# Patient Record
Sex: MALE | NOT HISPANIC OR LATINO | URBAN - METROPOLITAN AREA
[De-identification: names, ages, dates, MRNs, and addresses within clinical notes are randomized per-mention and may not be internally consistent; named-entity substitution may affect disease eponyms.]

---

## 2017-11-21 ENCOUNTER — IMPORTED ENCOUNTER (OUTPATIENT)
Dept: URBAN - METROPOLITAN AREA CLINIC 1 | Facility: CLINIC | Age: 52
End: 2017-11-21

## 2017-11-21 PROBLEM — H26.491: Noted: 2017-11-21

## 2017-11-21 PROBLEM — H25.812: Noted: 2017-11-21

## 2017-11-21 PROBLEM — E11.9: Noted: 2017-11-21

## 2017-11-21 PROBLEM — Z79.84: Noted: 2017-11-21

## 2017-11-21 PROCEDURE — 92015 DETERMINE REFRACTIVE STATE: CPT

## 2017-11-21 PROCEDURE — 92004 COMPRE OPH EXAM NEW PT 1/>: CPT

## 2017-11-21 NOTE — PATIENT DISCUSSION
1.  DM Type II without sign of diabetic retinopathy and no blot heme on dilated retinal examination today OU No Macular Edema:  Discussed the pathophysiology of diabetes and its effect on the eye and risk of blindness. Stressed the importance of strong glucose control. Advised of importance of at least yearly dilated examinations but to contact us immediately for any problems or concerns. 2. Type II diabetes controlled by oral medications. 3.  PSC Cataract OS: Visually Significant discussed the risks benefits alternatives and limitations of cataract surgery. The patient stated a full understanding and a desire to proceed with the procedure. The patient will need to return for preop appointment with cataract measurements and to have any additional questions answered and start pre-operative eye drops as directed. Schedule melly PCL OSOtherwise follow-up for Yag Cap OD4. PCO OD- Visually Significant and yag cap recommended. Risks and benefits discussed with pt and pt desires to schedule yag cap. 5.  PSeudophakia OD [facility])- Doing well. 6.  Return for an appointment for Yag Cap OD in 1-6 weeks/ Nancy H&P OS with Dr. Trevor Beltrán.

## 2017-12-01 ENCOUNTER — IMPORTED ENCOUNTER (OUTPATIENT)
Dept: URBAN - METROPOLITAN AREA CLINIC 1 | Facility: CLINIC | Age: 52
End: 2017-12-01

## 2017-12-01 PROBLEM — H26.491: Noted: 2017-12-01

## 2017-12-01 PROBLEM — H25.812: Noted: 2017-12-01

## 2017-12-01 PROCEDURE — 92136 OPHTHALMIC BIOMETRY: CPT

## 2017-12-01 PROCEDURE — 66821 AFTER CATARACT LASER SURGERY: CPT

## 2017-12-01 NOTE — PATIENT DISCUSSION
1.  YAG CAP OD: (Consent signed and scanned into attachments) 1 gtt Prolensa applied. The purpose and nature of the procedure possible alternative methods of treatment the risks involved and the possibility of complications were discussed with patient. The Patient wishes to proceed and the consent was signed. The laser was then performed under topical anesthesia with no complications. Post op instructions were given to patient as well as a follow-up appointment. Patient was advised to call our office if any questions or concerns. Begin Prolensa Qdaily OD till sample out RTC as scheduled Phaco OS 2. Cataract OS: Visually Significant discussed the risks benefits alternatives and limitations of cataract surgery. The patient stated a full understanding and a desire to proceed with the procedure. The patient will need to start pre-operative eye drops as directed. Proceed w/ phaco PCL OSPt understands they will need glasses post-op to achieve their best corrected vision. Return for an appointment for sx OS with Dr. Nino Davis.

## 2017-12-06 ENCOUNTER — IMPORTED ENCOUNTER (OUTPATIENT)
Dept: URBAN - METROPOLITAN AREA CLINIC 1 | Facility: CLINIC | Age: 52
End: 2017-12-06

## 2017-12-06 PROBLEM — H25.812 COMBINED FORM OF SENILE CATARACT OF LEFT EYE: Status: RESOLVED | Noted: 2017-12-06 | Resolved: 2017-12-06

## 2017-12-06 PROBLEM — H25.812 COMBINED FORM OF SENILE CATARACT OF LEFT EYE: Status: ACTIVE | Noted: 2017-12-06

## 2017-12-07 ENCOUNTER — IMPORTED ENCOUNTER (OUTPATIENT)
Dept: URBAN - METROPOLITAN AREA CLINIC 1 | Facility: CLINIC | Age: 52
End: 2017-12-07

## 2017-12-07 PROBLEM — Z09: Noted: 2017-12-07

## 2017-12-07 PROBLEM — Z96.1: Noted: 2017-12-07

## 2017-12-07 PROCEDURE — 99024 POSTOP FOLLOW-UP VISIT: CPT

## 2017-12-07 NOTE — PATIENT DISCUSSION
1. POD#1 CE/IOL Standard OS doing well. Continue all 3 gtts as prescribed and until gone. Ocuflox TIDDurezol BIDIlevro QDPost op Warnings Reiterated  2. PO YAG Cap OD: good result. 3.  Return for an appointment for KR OU as scheduled with Dr. Beverley Singleton.

## 2018-01-02 ENCOUNTER — IMPORTED ENCOUNTER (OUTPATIENT)
Dept: URBAN - METROPOLITAN AREA CLINIC 1 | Facility: CLINIC | Age: 53
End: 2018-01-02

## 2018-01-02 PROBLEM — Z96.1: Noted: 2018-01-02

## 2018-01-02 PROBLEM — Z09: Noted: 2018-01-02

## 2018-01-02 PROCEDURE — 99024 POSTOP FOLLOW-UP VISIT: CPT

## 2018-01-02 NOTE — PATIENT DISCUSSION
1. POW#3 Phaco/ PCL OS (Standard) Doing well Finish PO meds per schedule MRX for glasses given 2. PO YAG Cap OD: good result. Return for an appointment in 6 mo 30 with Dr. Flor Vidales.

## 2018-07-11 ENCOUNTER — HOSPITAL ENCOUNTER (OUTPATIENT)
Dept: LAB | Age: 53
Discharge: HOME | End: 2018-07-11
Payer: COMMERCIAL

## 2018-07-11 ENCOUNTER — OFFICE VISIT (OUTPATIENT)
Dept: FAMILY MEDICINE CLINIC | Facility: CLINIC | Age: 53
End: 2018-07-11

## 2018-07-11 VITALS
BODY MASS INDEX: 28 KG/M2 | DIASTOLIC BLOOD PRESSURE: 82 MMHG | HEART RATE: 64 BPM | SYSTOLIC BLOOD PRESSURE: 132 MMHG | TEMPERATURE: 98.5 F | WEIGHT: 164 LBS | RESPIRATION RATE: 16 BRPM | HEIGHT: 64 IN

## 2018-07-11 DIAGNOSIS — R86.9 SEMEN ABNORMALITY: ICD-10-CM

## 2018-07-11 DIAGNOSIS — E11.9 CONTROLLED TYPE 2 DIABETES MELLITUS WITHOUT COMPLICATION, WITHOUT LONG-TERM CURRENT USE OF INSULIN (HCC): Primary | ICD-10-CM

## 2018-07-11 DIAGNOSIS — Z12.5 SCREENING FOR PROSTATE CANCER: ICD-10-CM

## 2018-07-11 DIAGNOSIS — E78.5 HYPERLIPIDEMIA, UNSPECIFIED HYPERLIPIDEMIA TYPE: ICD-10-CM

## 2018-07-11 DIAGNOSIS — B35.6 TINEA CRURIS: ICD-10-CM

## 2018-07-11 DIAGNOSIS — I10 ESSENTIAL HYPERTENSION: ICD-10-CM

## 2018-07-11 DIAGNOSIS — Z13.31 SCREENING FOR DEPRESSION: ICD-10-CM

## 2018-07-11 LAB
BILIRUB UR QL STRIP: NEGATIVE
GLUCOSE UR-MCNC: NORMAL MG/DL
HBA1C MFR BLD HPLC: 5.7 %
KETONES P FAST UR STRIP-MCNC: NEGATIVE MG/DL
PH UR STRIP: 6 [PH] (ref 4.6–8)
PROT UR QL STRIP: NEGATIVE
SP GR UR STRIP: 1.03 (ref 1–1.03)
UA UROBILINOGEN AMB POC: NORMAL (ref 0.2–1)
URINALYSIS CLARITY POC: CLEAR
URINALYSIS COLOR POC: YELLOW
URINE BLOOD POC: NORMAL
URINE LEUKOCYTES POC: NEGATIVE
URINE NITRITES POC: NEGATIVE

## 2018-07-11 PROCEDURE — 87086 URINE CULTURE/COLONY COUNT: CPT | Performed by: INTERNAL MEDICINE

## 2018-07-11 RX ORDER — NYSTATIN 100000 U/G
OINTMENT TOPICAL 2 TIMES DAILY
Qty: 60 G | Refills: 3 | Status: SHIPPED | OUTPATIENT
Start: 2018-07-11 | End: 2018-08-08

## 2018-07-11 RX ORDER — LISINOPRIL 5 MG/1
TABLET ORAL
Refills: 3 | COMMUNITY
Start: 2018-06-18 | End: 2018-08-08 | Stop reason: SDUPTHER

## 2018-07-11 NOTE — PROGRESS NOTES
Ronnie Quiles is 46 y.o. male presents today for office to establish care. Pt c/o rash to bilateral inner thighs for over 3 months. Pt c/o yellow colored ejaculation x 1 month. Pt is not fasting. Pt is in Room# 2. 1. ER/Urgent care visit in the past 3 months? NO  2. Have you seen or consulted any other health care providers outside of the [facility] since your last visit? Include any pap smears or colon screening. Health Maintenance reviewed and pt advised to obtain tdap from pharmacy.       Upcoming Appts  NONE    Requested Prescriptions      No prescriptions requested or ordered in this encounter   for over 3 mo    Visit Vitals    /82 (BP 1 Location: Left arm, BP Patient Position: Sitting)    Pulse 64    Temp 98.5 °F (36.9 °C) (Oral)    Resp 16    Ht 5' 4\" (1.626 m)    Wt 164 lb (74.4 kg)    BMI 28.15 kg/m2

## 2018-07-11 NOTE — MR AVS SNAPSHOT
[address] [id number] 
[phone] Patient: Becky Nava MRN: [id number] :1965 Visit Information Date & Time Provider Department Dept. Phone Encounter #  
 2018 10:00 AM Amber Aguilera  Shruthi James [phone] [id number] Follow-up Instructions Return in about 4 weeks (around 2018) for Go over lab/imaging results, Follow up diabetes mellitus, Follow up weight management, Follow up hyp. Upcoming Health Maintenance Date Due Hepatitis C Screening 1965 DTaP/Tdap/Td series (1 - Tdap) 1986 FOBT Q 1 YEAR AGE 50-75 2015 Influenza Age 5 to Adult 2018 Allergies as of 2018  Review Complete On: 2018 By: Amber Aguilera MD  
  
 Severity Noted Reaction Type Reactions Chicken Derived  2018    Hives Shellfish Derived  2018    Hives Current Immunizations  Never Reviewed No immunizations on file. Not reviewed this visit You Were Diagnosed With   
  
 Codes Comments Controlled type 2 diabetes mellitus without complication, without long-term current use of insulin ([address]    -  Primary ICD-10-CM: E11.9 ICD-9-CM: 250.00 Essential hypertension     ICD-10-CM: I10 
ICD-9-CM: 401.9 Hyperlipidemia, unspecified hyperlipidemia type     ICD-10-CM: E78.5 ICD-9-CM: 272.4 Screen for colon cancer     ICD-10-CM: Z12.11 ICD-9-CM: V76.51 Screening for depression     ICD-10-CM: Z13.89 ICD-9-CM: V79.0 Screening for prostate cancer     ICD-10-CM: Z12.5 ICD-9-CM: V76.44 Vitals BP Pulse Temp Resp Height(growth percentile) Weight(growth percentile) 132/82 (BP 1 Location: Left arm, BP Patient Position: Sitting) 64 98.5 °F (36.9 °C) (Oral) 16 5' 4\" (1.626 m) 164 lb (74.4 kg) BMI Smoking Status 28.15 kg/m2 Never Smoker Vitals History BMI and BSA Data Body Mass Index Body Surface Area  
 28.15 kg/m 2 1.83 m 2 Preferred Pharmacy Pharmacy Name Phone [facility], [address] [place] [address]  [phone] Your Updated Medication List  
  
   
This list is accurate as of 7/11/18 10:32 AM.  Always use your most recent med list.  
  
  
  
  
 atorvastatin 10 mg tablet Commonly known as:  LIPITOR Take 10 mg by mouth daily. lisinopril 5 mg tablet Commonly known as:  Robertha Roup TK ONE T PO QD  
  
 metFORMIN 500 mg tablet Commonly known as:  GLUCOPHAGE Take 500 mg by mouth two (2) times daily (with meals). We Performed the Following AMB POC HEMOGLOBIN A1C [63309 CPT(R)] [address] [facility] [ [facility] [place]] Follow-up Instructions Return in about 4 weeks (around 8/8/2018) for Go over lab/imaging results, Follow up diabetes mellitus, Follow up weight management, Follow up hyp. To-Do List   
 07/11/2018 Lab:  OCCULT BLOOD, IMMUNOASSAY (FIT)   
  
 07/11/2018 Lab:  PSA, DIAGNOSTIC (PROSTATE SPECIFIC AG)   
  
 07/11/2018 Lab:  T4, FREE   
  
 07/11/2018 Lab:  TSH 3RD GENERATION   
  
 07/14/2018 Lab:  CBC WITH AUTOMATED DIFF   
  
 07/14/2018 Lab:  LIPID PANEL   
  
 07/14/2018 Lab:  METABOLIC PANEL, COMPREHENSIVE Patient Instructions Information given on ketogenic/IF diet Introducing [facility]! [employer] introduces Connecture patient portal. Now you can access parts of your medical record, email your doctor's office, and request medication refills online. 1. In your internet browser, go to [email or web address] 2. Click on the First Time User? Click Here link in the Sign In box. You will see the New Member Sign Up page. 3. Enter your Connecture Access Code exactly as it appears below.  You will not need to use this code after youve completed the sign-up process. If you do not sign up before the expiration date, you must request a new code. · UserMojo Access Code: [email or web address] Expires: 10/9/2018 10:32 AM 
 
4. Enter the last four digits of your Social Security Number (xxxx) and Date of Birth (mm/dd/yyyy) as indicated and click Submit. You will be taken to the next sign-up page. 5. Create a UserMojo ID. This will be your UserMojo login ID and cannot be changed, so think of one that is secure and easy to remember. 6. Create a UserMojo password. You can change your password at any time. 7. Enter your Password Reset Question and Answer. This can be used at a later time if you forget your password. 8. Enter your e-mail address. You will receive e-mail notification when new information is available in [address] 9. Click Sign Up. You can now view and download portions of your medical record. 10. Click the Download Summary menu link to download a portable copy of your medical information. If you have questions, please visit the Frequently Asked Questions section of the UserMojo website. Remember, UserMojo is NOT to be used for urgent needs. For medical emergencies, dial 911. Now available from your iPhone and Android! Please provide this summary of care documentation to your next provider. Your primary care clinician is listed as José Wallace. If you have any questions after today's visit, please call [phone].

## 2018-07-11 NOTE — PROGRESS NOTES
History and Physical    Today's Date:  2018   Patient's Name: Jeana Rao   Patient's :  1965     History:     Chief Complaint   Patient presents with   [facility]    Diabetes    Skin Problem     bilateral inner thighs     Jeana Rao is a 46 y.o. male presenting for initial visit to establish care. Will obtain records from previous provider to review. Care team updated on connect care. Hypertension   This is a chronic problem, new to me. BP is at goal. Pt takes lisinopril. Pt reports compliance with this medication. Hyperlipidemia  This is a chronic problem, new to me. Current status is unknown. Pt takes lipitor. Pt reports compliance with this medication. Overweight  This is a chronic problem, new to me. This is not at goal. Pt does not exercise regularly. Pt tries to eat a healthy diet. Rash  This is a new problem. This is not controlled. This has been present for over three months. This is located on his inner thighs. Past Medical History:   Diagnosis Date    Controlled type 2 diabetes mellitus without complication, without long-term current use of insulin ([address] 2018    Diabetes ([address]     Hyperlipemia     Hypertension      Past Surgical History:   Procedure Laterality Date    HX APPENDECTOMY      HX CATARACT REMOVAL Right       reports that he has never smoked. He has never used smokeless tobacco. He reports that he does not drink alcohol or use illicit drugs.   Family History   Problem Relation Age of Onset    Heart Disease Mother     Hypertension Mother     Dementia Father      Allergies   Allergen Reactions    Chicken Derived Hives    Shellfish Derived Hives     Problem List:      Patient Active Problem List   Diagnosis Code    Essential hypertension I10    Controlled type 2 diabetes mellitus without complication, without long-term current use of insulin ([address] E11.9    Hyperlipidemia E78.5     Medications: Current Outpatient Prescriptions   Medication Sig    nystatin (MYCOSTATIN) 100,000 unit/gram ointment Apply  to affected area two (2) times a day.  metFORMIN (GLUCOPHAGE) 500 mg tablet Take 500 mg by mouth two (2) times daily (with meals).  atorvastatin (LIPITOR) 10 mg tablet Take 10 mg by mouth daily.  lisinopril (PRINIVIL, ZESTRIL) 5 mg tablet TK ONE T PO QD     No current facility-administered medications for this visit.       Review of Systems:   (Positives in bold)   General:   fevers, chills, generalized weakness, fatigue, weight change, night sweats, appetite change   Neurologic: dizziness, lightheadedness, headaches, loss of consciousness, numbness, tingling, focal weakness  Eyes:  vision changes (blurry vision, sees Dr. Eloisa Juarez), double vision, photophobia  Ears:  change in hearing, ear pain, ear discharge, ear ringing  Nose:  sneezing, runny nose, nasal congestion  Mouth/Throat: sore throat, voice change, dry mouth, difficulty swallowing  Neck:  pain, stiffness, swelling  Respiratory: dyspnea at rest, dyspnea on exertion, wheezing, cough, sputum production  Cardiovascular:   chest pain, palpitations, pedal edema, leg cramps  Gastrointestinal:  nausea, vomiting, abdominal pain, constipation, diarrhea, heart burn, bloody stools, tarry black stools, rectal pain, hemorrhoids  Urinary: dysuria, urinary frequency, nocturia, malodorous urine, difficulty initiating flow, slow urine stream  Genital (M): penile discharge, ulcerations, rashes, erectile dysfunction  Musculoskeletal:  joint pain, joint stiffness, joint swelling, back pain, focal muscle pain, diffuse myalgias  Psychiatric: insomnia, anxiety, depression, hallucinations, suicidal ideation, homicidal ideation  Endocrine: polydipsia, polyuria, polyphagia, cold intolerance, heat intolerance  Hematologic: easy bruising, easy bleeding  Dermatologic: Itching, rash    Physical Assessment:   VS:    Visit Vitals    /82 (BP 1 Location: Left arm, BP Patient Position: Sitting)    Pulse 64    Temp 98.5 °F (36.9 °C) (Oral)    Resp 16    Ht 5' 4\" (1.626 m)    Wt 164 lb (74.4 kg)    BMI 28.15 kg/m2     General:   Well-groomed, well-nourished, in no distress, pleasant, alert, appropriate and conversant. Eyes:    PERRL, EOMI. Mouth:  MMM, good dentition, oropharynx WNL without membranes, exudates, petechiae or ulcers  Neck:   Neck supple, no swelling, mass or tenderness  Cardiovascular:   No JVD. RRR, no MRG. Pulmonary:   Lungs clear bilaterally. Normal respiratory effort. Abdomen:   Abdomen soft, NT, ND, NAB  Extremities:   No edema, LEs warm and well-perfused. Neuro:   Alert and oriented, no focal deficits. No facial asymmetry noted. Skin:    No jaundice, +thickened, erythematous rash on inner thighs  MSK:   Normal ROM, 5/5 muscle strength  Psych:  No pressured speech or abnormal thought content    PHQ over the last two weeks 7/11/2018   Little interest or pleasure in doing things Not at all   Feeling down, depressed or hopeless Not at all   Total Score PHQ 2 0     Lab Results   Component Value Date/Time    Hemoglobin A1c (POC) 5.7 07/11/2018 10:00 AM     Results for orders placed or performed in visit on 07/11/18   AMB POC URINALYSIS DIP STICK AUTO W/ MICRO     Status: None   Result Value Ref Range Status    Color (UA POC) Yellow  Final    Clarity (UA POC) Clear  Final    Glucose (UA POC) 2+ Negative Final    Bilirubin (UA POC) Negative Negative Final    Ketones (UA POC) Negative Negative Final    Specific gravity (UA POC) 1.030 1.001 - 1.035 Final    Blood (UA POC) 1+ Negative Final    pH (UA POC) 6 4.6 - 8.0 Final    Protein (UA POC) Negative Negative Final    Urobilinogen (UA POC) 0.2 mg/dL 0.2 - 1 Final    Nitrites (UA POC) Negative Negative Final    Leukocyte esterase (UA POC) Negative Negative Final     Assessment/Plan & Orders:         ICD-10-CM ICD-9-CM    1.  Controlled type 2 diabetes mellitus without complication, without long-term current use of insulin (HCC) E11.9 250.00 LIPID PANEL      METABOLIC PANEL, COMPREHENSIVE      TSH 3RD GENERATION      T4, FREE      AMB POC HEMOGLOBIN A1C   2. Essential hypertension I10 401.9 CBC WITH AUTOMATED DIFF   3. Tinea cruris B35.6 110.3 nystatin (MYCOSTATIN) 100,000 unit/gram ointment   4. Hyperlipidemia, unspecified hyperlipidemia type E78.5 272.4    5. Screening for depression Z13.89 V79.0 [place] DEPRESSION SCREEN ANNUAL   6. Screening for prostate cancer Z12.5 V76.44 PSA, DIAGNOSTIC (PROSTATE SPECIFIC AG)     HM  Colon cancer: Colonoscopy done in 2016, next one in 5 yrs, done in the [facility]  Influenza vaccine: due in the fall  Pneumococcal vaccine: due at age 72   Tdap: unknown  Herpes Zoster vaccine: due at age 61  Hep B vaccine: not indicated (liver dz, DM 19-59)  Weight:  Body mass index is 28.15 kg/(m^2). Discussed the patient's BMI with him. The BMI follow up plan is as follows: improve diet and exercise at least 30 min a day five times a week  Prostate cancer:  Discuss re: screening with PSA between ages 48 and 71  Osteoporosis:  No indication for dexa scan    Healthy lifestyle has been encouraged including avoidance of tobacco, limiting or avoiding alcohol intake, heart healthy diet which is low in cholesterol and saturated fat and contains fresh fruits, vegetables and whole grains and fiber, regular exercise with goals of 20-30 minutes 3-5 days weekly and maintaining an optimal BMI. Stop metformin  Information given on ketogenic/IF diet  Pt declined  exam  Depression [email or web address]       Follow-up Disposition:  Return in about 4 weeks (around 2018) for Go over lab/imaging results, Follow up diabetes mellitus, Follow up weight management, Follow up hyp. *Patient verbalized understanding and agreement with the plan. Patient was given an after-visit summary. Hugo Morrison.  [address], [place] - Internal Medicine  2018, 9:56 AM  [address] [place] [address]  Phone [phone]  Fax [phone]

## 2018-07-13 LAB
BACTERIA SPEC CULT: NORMAL
SERVICE CMNT-IMP: NORMAL

## 2018-07-16 ENCOUNTER — HOSPITAL ENCOUNTER (OUTPATIENT)
Dept: LAB | Age: 53
Discharge: HOME | End: 2018-07-16
Payer: COMMERCIAL

## 2018-07-16 DIAGNOSIS — I10 ESSENTIAL HYPERTENSION: ICD-10-CM

## 2018-07-16 DIAGNOSIS — E11.9 CONTROLLED TYPE 2 DIABETES MELLITUS WITHOUT COMPLICATION, WITHOUT LONG-TERM CURRENT USE OF INSULIN (HCC): ICD-10-CM

## 2018-07-16 DIAGNOSIS — Z12.5 SCREENING FOR PROSTATE CANCER: ICD-10-CM

## 2018-07-16 LAB
ALBUMIN SERPL-MCNC: 4.2 G/DL (ref 3.4–5)
ALBUMIN/GLOB SERPL: 1.3 {RATIO} (ref 0.8–1.7)
ALP SERPL-CCNC: 56 U/L (ref 45–117)
ALT SERPL-CCNC: 37 U/L (ref 16–61)
ANION GAP SERPL CALC-SCNC: 8 MMOL/L (ref 3–18)
AST SERPL-CCNC: 20 U/L (ref 15–37)
BASOPHILS # BLD: 0 K/UL (ref 0–0.1)
BASOPHILS NFR BLD: 1 % (ref 0–2)
BILIRUB SERPL-MCNC: 1.1 MG/DL (ref 0.2–1)
BUN SERPL-MCNC: 19 MG/DL (ref 7–18)
BUN/CREAT SERPL: 20 (ref 12–20)
CALCIUM SERPL-MCNC: 8.5 MG/DL (ref 8.5–10.1)
CHLORIDE SERPL-SCNC: 107 MMOL/L (ref 100–108)
CHOLEST SERPL-MCNC: 105 MG/DL
CO2 SERPL-SCNC: 25 MMOL/L (ref 21–32)
CREAT SERPL-MCNC: 0.94 MG/DL (ref 0.6–1.3)
DIFFERENTIAL METHOD BLD: ABNORMAL
EOSINOPHIL # BLD: 0.4 K/UL (ref 0–0.4)
EOSINOPHIL NFR BLD: 5 % (ref 0–5)
ERYTHROCYTE [DISTWIDTH] IN BLOOD BY AUTOMATED COUNT: 11.8 % (ref 11.6–14.5)
GLOBULIN SER CALC-MCNC: 3.2 G/DL (ref 2–4)
GLUCOSE SERPL-MCNC: 135 MG/DL (ref 74–99)
HCT VFR BLD AUTO: 41.8 % (ref 36–48)
HDLC SERPL-MCNC: 37 MG/DL (ref 40–60)
HDLC SERPL: 2.8 {RATIO} (ref 0–5)
HGB BLD-MCNC: 14.3 G/DL (ref 13–16)
LDLC SERPL CALC-MCNC: 54.8 MG/DL (ref 0–100)
LIPID PROFILE,FLP: ABNORMAL
LYMPHOCYTES # BLD: 2.8 K/UL (ref 0.9–3.6)
LYMPHOCYTES NFR BLD: 36 % (ref 21–52)
MCH RBC QN AUTO: 31.5 PG (ref 24–34)
MCHC RBC AUTO-ENTMCNC: 34.2 G/DL (ref 31–37)
MCV RBC AUTO: 92.1 FL (ref 74–97)
MONOCYTES # BLD: 0.5 K/UL (ref 0.05–1.2)
MONOCYTES NFR BLD: 6 % (ref 3–10)
NEUTS SEG # BLD: 4.1 K/UL (ref 1.8–8)
NEUTS SEG NFR BLD: 52 % (ref 40–73)
PLATELET # BLD AUTO: 251 K/UL (ref 135–420)
PMV BLD AUTO: 10.8 FL (ref 9.2–11.8)
POTASSIUM SERPL-SCNC: 4 MMOL/L (ref 3.5–5.5)
PROT SERPL-MCNC: 7.4 G/DL (ref 6.4–8.2)
PSA SERPL-MCNC: 0.4 NG/ML (ref 0–4)
RBC # BLD AUTO: 4.54 M/UL (ref 4.7–5.5)
SODIUM SERPL-SCNC: 140 MMOL/L (ref 136–145)
T4 FREE SERPL-MCNC: 1.1 NG/DL (ref 0.7–1.5)
TRIGL SERPL-MCNC: 66 MG/DL (ref ?–150)
TSH SERPL DL<=0.05 MIU/L-ACNC: 0.86 UIU/ML (ref 0.36–3.74)
VLDLC SERPL CALC-MCNC: 13.2 MG/DL
WBC # BLD AUTO: 7.8 K/UL (ref 4.6–13.2)

## 2018-07-16 PROCEDURE — 80061 LIPID PANEL: CPT | Performed by: INTERNAL MEDICINE

## 2018-07-16 PROCEDURE — 84153 ASSAY OF PSA TOTAL: CPT | Performed by: INTERNAL MEDICINE

## 2018-07-16 PROCEDURE — 80053 COMPREHEN METABOLIC PANEL: CPT | Performed by: INTERNAL MEDICINE

## 2018-07-16 PROCEDURE — 84439 ASSAY OF FREE THYROXINE: CPT | Performed by: INTERNAL MEDICINE

## 2018-07-16 PROCEDURE — 84443 ASSAY THYROID STIM HORMONE: CPT | Performed by: INTERNAL MEDICINE

## 2018-07-16 PROCEDURE — 85025 COMPLETE CBC W/AUTO DIFF WBC: CPT | Performed by: INTERNAL MEDICINE

## 2018-07-20 ENCOUNTER — IMPORTED ENCOUNTER (OUTPATIENT)
Dept: URBAN - METROPOLITAN AREA CLINIC 1 | Facility: CLINIC | Age: 53
End: 2018-07-20

## 2018-07-20 PROBLEM — Z79.84: Noted: 2018-07-20

## 2018-07-20 PROBLEM — Z96.1: Noted: 2018-07-20

## 2018-07-20 PROBLEM — E11.9: Noted: 2018-07-20

## 2018-07-20 PROCEDURE — 92014 COMPRE OPH EXAM EST PT 1/>: CPT

## 2018-07-20 NOTE — PATIENT DISCUSSION
1.  DM Type II (Oral Meds) -- Without sign of diabetic retinopathy and no blot heme on dilated retinal examination today OU No Macular Edema:  Discussed the pathophysiology of diabetes and its effect on the eye and risk of blindness. Stressed the importance of strong glucose control. Advised of importance of at least yearly dilated examinations but to contact us immediately for any problems or concerns. 2. Pseudophakia OU w/ s/p YAG Cap OD (PCL & YAG Cap done in [place] [address] by [facility]) Letter to PCP. Return for an appointment in 1 YR for a 30 OU with Dr. Ana Cristina Watts. Discussed with patient best corrected vision will be achieved with glasses and / or CTL. Discussed with patient the option of refractive sx PRN.

## 2018-08-08 ENCOUNTER — OFFICE VISIT (OUTPATIENT)
Dept: FAMILY MEDICINE CLINIC | Facility: CLINIC | Age: 53
End: 2018-08-08

## 2018-08-08 ENCOUNTER — HOSPITAL ENCOUNTER (OUTPATIENT)
Dept: LAB | Age: 53
Discharge: HOME | End: 2018-08-08
Payer: COMMERCIAL

## 2018-08-08 VITALS
TEMPERATURE: 98.2 F | WEIGHT: 163.8 LBS | OXYGEN SATURATION: 95 % | RESPIRATION RATE: 16 BRPM | HEIGHT: 64 IN | HEART RATE: 64 BPM | BODY MASS INDEX: 27.96 KG/M2 | DIASTOLIC BLOOD PRESSURE: 85 MMHG | SYSTOLIC BLOOD PRESSURE: 126 MMHG

## 2018-08-08 DIAGNOSIS — E11.9 DIABETES MELLITUS WITHOUT COMPLICATION (HCC): Primary | ICD-10-CM

## 2018-08-08 DIAGNOSIS — Z11.59 NEED FOR HEPATITIS C SCREENING TEST: ICD-10-CM

## 2018-08-08 DIAGNOSIS — E78.5 HYPERLIPIDEMIA, UNSPECIFIED HYPERLIPIDEMIA TYPE: ICD-10-CM

## 2018-08-08 DIAGNOSIS — L30.9 DERMATITIS: ICD-10-CM

## 2018-08-08 DIAGNOSIS — E11.9 DIABETES MELLITUS WITHOUT COMPLICATION (HCC): ICD-10-CM

## 2018-08-08 DIAGNOSIS — Z12.11 SCREEN FOR COLON CANCER: ICD-10-CM

## 2018-08-08 DIAGNOSIS — I10 ESSENTIAL HYPERTENSION: ICD-10-CM

## 2018-08-08 PROBLEM — E66.3 OVERWEIGHT (BMI 25.0-29.9): Status: ACTIVE | Noted: 2018-08-08

## 2018-08-08 PROCEDURE — 86803 HEPATITIS C AB TEST: CPT | Performed by: INTERNAL MEDICINE

## 2018-08-08 PROCEDURE — 82043 UR ALBUMIN QUANTITATIVE: CPT | Performed by: INTERNAL MEDICINE

## 2018-08-08 PROCEDURE — 36415 COLL VENOUS BLD VENIPUNCTURE: CPT | Performed by: INTERNAL MEDICINE

## 2018-08-08 RX ORDER — ATORVASTATIN CALCIUM 10 MG/1
10 TABLET, FILM COATED ORAL DAILY
Qty: 90 TAB | Refills: 3 | Status: SHIPPED | OUTPATIENT
Start: 2018-08-08 | End: 2019-08-22 | Stop reason: SDUPTHER

## 2018-08-08 RX ORDER — LISINOPRIL 5 MG/1
TABLET ORAL
Qty: 90 TAB | Refills: 3 | Status: SHIPPED | OUTPATIENT
Start: 2018-08-08 | End: 2019-08-22 | Stop reason: SDUPTHER

## 2018-08-08 NOTE — PROGRESS NOTES
Seleta Sever is 46 y.o. male presents today for office visit for follow up for diabetes and weight management. Pt is fasting. Pt is in Room# 2.    1. Have you been to the ER, urgent care clinic since your last visit? Hospitalized since your last visit? NO    2. Have you seen or consulted any other health care providers outside of the [facility] since your last visit? Include any pap smears or colon screening. NO    Health Maintenance reviewed. Upcoming Appts  none      Requested Prescriptions     Pending Prescriptions Disp Refills    lisinopril (PRINIVIL, ZESTRIL) 5 mg tablet 90 Tab 3     Sig: TK ONE T PO QD    atorvastatin (LIPITOR) 10 mg tablet 90 Tab 3     Sig: Take 1 Tab by mouth daily.        Visit Vitals    /85 (BP 1 Location: Right arm, BP Patient Position: Sitting)    Pulse 64    Temp 98.2 °F (36.8 °C) (Oral)    Resp 16    Ht 5' 4\" (1.626 m)    Wt 163 lb 12.8 oz (74.3 kg)    SpO2 95%    BMI 28.12 kg/m2

## 2018-08-08 NOTE — PROGRESS NOTES
Internal Medicine Progress Note    Today's Date:  2018   Patient:  Bobby Ca  Patient :  1965    Subjective:     Chief Complaint   Patient presents with    Diabetes    Weight Management    Rash     bilateral groin creases       Diabetes mellitus  This is a chronic problem. BS is at goal. Pt is on metformin. Pt reports compliance with this medication. Hyperlipidemia  This is a chronic problem. This is at goal. Pt takes lipitor. Pt reports compliance with this medication.      Overweight  This is a chronic problem. This is not at goal. Pt is on the ketogenic/IF diet. Pt lost 1 lb in 4 wks.       Rash  This is a new problem. This is not controlled. This has been present for over three months. This is located on his inner thighs. Past Medical History:   Diagnosis Date    Controlled type 2 diabetes mellitus without complication, without long-term current use of insulin ([address] 2018    Dermatitis 2018    Diabetes ([address]     Hyperlipemia     Hypertension     Overweight (BMI 25.0-29. 9) 2018     Past Surgical History:   Procedure Laterality Date    HX APPENDECTOMY      HX CATARACT REMOVAL Right       reports that he has never smoked. He has never used smokeless tobacco. He reports that he does not drink alcohol or use illicit drugs. Family History   Problem Relation Age of Onset    Heart Disease Mother     Hypertension Mother     Dementia Father      Allergies   Allergen Reactions    Chicken Derived Hives    Shellfish Derived Hives     Review of Systems   Positives in bold  CV:      chest pain, palpitations  PULM:  SOB, wheezing, cough, sputum production    Current Outpatient Meds and Allergies     Current Outpatient Prescriptions on File Prior to Visit   Medication Sig Dispense Refill    metFORMIN (GLUCOPHAGE) 500 mg tablet Take 500 mg by mouth two (2) times daily (with meals). No current facility-administered medications on file prior to visit. Allergies   Allergen Reactions    Chicken Derived Hives    Shellfish Derived Hives     Objective:     VS:    Visit Vitals    /85 (BP 1 Location: Right arm, BP Patient Position: Sitting)    Pulse 64    Temp 98.2 °F (36.8 °C) (Oral)    Resp 16    Ht 5' 4\" (1.626 m)    Wt 163 lb 12.8 oz (74.3 kg)    SpO2 95%    BMI 28.12 kg/m2     General:   Well-nourished, well-groomed, pleasant, alert, in no acute distress  Head:  Normocephalic, atraumatic  Ears:  External ears WNL  Nose:  External nares WNL  Psych:  No pressured speech, no abnormal thought content  Skin:  +rash on b/l inner thighs, boil on left inner thigh  Diabetic foot exam: monofilament exam normal, no open lesions or or erythema    PHQ over the last two weeks 7/11/2018   Little interest or pleasure in doing things Not at all   Feeling down, depressed, irritable, or hopeless Not at all   Total Score PHQ 2 0     Lab Results  Component Value Date/Time   Glucose 135 (H) 07/16/2018 08:35 AM   Glucose  12/06/2017 02:40 PM   LDL, calculated 54.8 07/16/2018 08:35 AM   Creatinine 0.94 07/16/2018 08:35 AM      Assessment/Plan & Orders:         ICD-10-CM ICD-9-CM    1. Diabetes mellitus without complication (HCC) E89.0 250.00  DIABETES FOOT EXAM      MICROALBUMIN, UR, RAND W/ MICROALB/CREAT RATIO      lisinopril (PRINIVIL, ZESTRIL) 5 mg tablet      atorvastatin (LIPITOR) 10 mg tablet   2. Dermatitis L30.9 692.9 REFERRAL TO DERMATOLOGY   3. Essential hypertension I10 401.9    4. Hyperlipidemia, unspecified hyperlipidemia type E78.5 272.4    5. Screen for colon cancer Z12.11 V76.51 OCCULT BLOOD IMMUNOASSAY,DIAGNOSTIC   6.  Need for hepatitis C screening test Z11.59 V73.89 HEPATITIS C AB     Healthy lifestyle has been encouraged including avoidance of tobacco, limiting or avoiding alcohol intake, heart healthy diet which is low in cholesterol and saturated fat and contains fresh fruits, vegetables and whole grains and fiber, regular exercise with goals of 20-30 minutes 3-5 days weekly and maintaining an optimal BMI. Continue ketogenic/IF diet  Decrease metformin to 500 mg po daily  Depression screenin18    Follow-up Disposition:  Return in about 3 months (around 2018) for Follow up diabetes mellitus, Follow up hypertension, Follow up hyperlipidemia, Follow up weight amee. *Patient verbalized understanding and agreement with the plan. Patient was given an after-visit summary. Ghazala Alvarez.  [address], MD - Internal Medicine  2018, 10:46 AM  [address] James  [address] [place] [address] Crist[place], [address]  Phone [phone]  Fax [phone]

## 2018-08-08 NOTE — MR AVS SNAPSHOT
[address] [place] [address] [phone] [id number] 
[phone] Patient: Becky Nava MRN: [id number] :1965 Visit Information Date & Time Provider Department Dept. Phone Encounter #  
 2018 10:30 AM Amber Aguilera MD [facility] [phone] [id number] Upcoming Health Maintenance Date Due Hepatitis C Screening 1965 MICROALBUMIN Q1 1975 Pneumococcal 19-64 Medium Risk (1 of 1 - PPSV23) 1984 DTaP/Tdap/Td series (1 - Tdap) 1986 FOBT Q 1 YEAR AGE 50-75 2015 Influenza Age 5 to Adult 2018 EYE EXAM RETINAL OR DILATED Q1 2019 HEMOGLOBIN A1C Q6M 2019 LIPID PANEL Q1 2019 FOOT EXAM Q1 2019 Allergies as of 2018  Review Complete On: 2018 By: Richard Willett LPN Severity Noted Reaction Type Reactions Chicken Derived  2018    Hives Shellfish Derived  2018    Hives Current Immunizations  Never Reviewed No immunizations on file. Not reviewed this visit You Were Diagnosed With   
  
 Codes Comments Diabetes mellitus without complication ([facility]    -  Primary ICD-10-CM: E11.9 ICD-9-CM: 250.00 Dermatitis     ICD-10-CM: L30.9 ICD-9-CM: 692.9 Essential hypertension     ICD-10-CM: I10 
ICD-9-CM: 401.9 Hyperlipidemia, unspecified hyperlipidemia type     ICD-10-CM: E78.5 ICD-9-CM: 272.4 Screen for colon cancer     ICD-10-CM: Z12.11 ICD-9-CM: V76.51 Vitals BP Pulse Temp Resp Height(growth percentile) Weight(growth percentile) 126/85 (BP 1 Location: Right arm, BP Patient Position: Sitting) 64 98.2 °F (36.8 °C) (Oral) 16 5' 4\" (1.626 m) 163 lb 12.8 oz (74.3 kg) SpO2 BMI Smoking Status 95% 28.12 kg/m2 Never Smoker BMI and BSA Data Body Mass Index Body Surface Area  
 28.12 kg/m 2 1.83 m 2 Preferred Pharmacy Pharmacy Name Phone [facility], [address] [place] [address]  [phone] Your Updated Medication List  
  
   
This list is accurate as of 8/8/18 10:35 AM.  Always use your most recent med list.  
  
  
  
  
 atorvastatin 10 mg tablet Commonly known as:  LIPITOR Take 1 Tab by mouth daily. lisinopril 5 mg tablet Commonly known as:  Robertha Roup TK ONE T PO QD  
  
 metFORMIN 500 mg tablet Commonly known as:  GLUCOPHAGE Take 500 mg by mouth two (2) times daily (with meals). nystatin 100,000 unit/gram ointment Commonly known as:  MYCOSTATIN Apply  to affected area two (2) times a day. Prescriptions Sent to Pharmacy Refills  
 lisinopril (PRINIVIL, ZESTRIL) 5 mg tablet 3 Sig: TK ONE T PO QD Class: Normal  
 Pharmacy: [facility] [address] [place] [address]  Ph #: [phone]  
 atorvastatin (LIPITOR) 10 mg tablet 3 Sig: Take 1 Tab by mouth daily. Class: Normal  
 Pharmacy: [facility] [address]  Ph #: [phone] Route: Oral  
  
We Performed the Following  DIABETES FOOT EXAM [HM7 Custom] REFERRAL TO DERMATOLOGY [REF19 Custom] To-Do List   
 08/08/2018 Lab:  MICROALBUMIN, UR, RAND W/ MICROALB/CREAT RATIO   
  
 08/08/2018 Lab:  OCCULT BLOOD IMMUNOASSAY,DIAGNOSTIC Referral Information Referral ID Referred By Referred To  
  
 [id number] [place] [address] [address] Cowan [address] Phone: [phone] Fax: [phone] Visits Status Start Date End Date 1 New Request 8/8/18 8/8/19 If your referral has a status of pending review or denied, additional information will be sent to support the outcome of this decision. Patient Instructions Dermatitis: Care Instructions Your Care Instructions Dermatitis is the general name used for any rash or inflammation of the skin. Different kinds of dermatitis cause different kinds of rashes. Common causes of a rash include new medicines, plants (such as poison oak or poison ivy), heat, and stress. Certain illnesses can also cause a rash. An allergic reaction to something that touches your skin, such as latex, nickel, or poison ivy, is called contact dermatitis. Contact dermatitis may also be caused by something that irritates the skin, such as bleach, a chemical, or soap. These types of rashes cannot be spread from person to person. How long your rash will last depends on what caused it. Rashes may last a few days or months. Follow-up care is a key part of your treatment and safety. Be sure to make and go to all appointments, and call your doctor if you are having problems. It's also a good idea to know your test results and keep a list of the medicines you take. How can you care for yourself at home? · Do not scratch the rash. Cut your nails short, and file them smooth. Or wear gloves if this helps keep you from scratching. · Wash the area with water only. Pat dry. · Put cold, wet cloths on the rash to reduce itching. · Keep cool, and stay out of the sun. · Leave the rash open to the air as much as possible. · If the rash itches, use hydrocortisone cream. Follow the directions on the label. Calamine lotion may help for plant rashes. · Take an over-the-counter antihistamine, such as diphenhydramine (Benadryl) or loratadine (Claritin), to help calm the itching. Read and follow all instructions on the label. · If your doctor prescribed a cream, use it as directed. If your doctor prescribed medicine, take it exactly as directed. When should you call for help? Call your doctor now or seek immediate medical care if: 
  · You have symptoms of infection, such as: ¨ Increased pain, swelling, warmth, or redness. ¨ Red streaks leading from the area. ¨ Pus draining from the area. ¨ A fever.  
  · You have joint pain along with the rash.  
 Watch closely for changes in your health, and be sure to contact your doctor if: 
  · Your rash is changing or getting worse.  
  · You are not getting better as expected. Where can you learn more? Go to http://loren-renee.info/. Enter [phone] in the search box to learn more about \"Dermatitis: Care Instructions. \" Current as of: October 5, 2017 Content Version: 11.7 © 5889-8987 [employer]. Care instructions adapted under license by [employer] (which disclaims liability or warranty for this information). If you have questions about a medical condition or this instruction, always ask your healthcare professional. [address] any warranty or liability for your use of this information. Introducing [facility]! Linnea White introduces MeBeam patient portal. Now you can access parts of your medical record, email your doctor's office, and request medication refills online. 1. In your internet browser, go to [email or web address] 2. Click on the First Time User? Click Here link in the Sign In box. You will see the New Member Sign Up page. 3. Enter your MeBeam Access Code exactly as it appears below. You will not need to use this code after youve completed the sign-up process. If you do not sign up before the expiration date, you must request a new code. · MeBeam Access Code: [email or web address] Expires: 10/9/2018 10:32 AM 
 
4. Enter the last four digits of your Social Security Number (xxxx) and Date of Birth (mm/dd/yyyy) as indicated and click Submit. You will be taken to the next sign-up page. 5. Create a MeBeam ID. This will be your MeBeam login ID and cannot be changed, so think of one that is secure and easy to remember. 6. Create a [employer] password. You can change your password at any time. 7. Enter your Password Reset Question and Answer. This can be used at a later time if you forget your password. 8. Enter your e-mail address. You will receive e-mail notification when new information is available in [address] 9. Click Sign Up. You can now view and download portions of your medical record. 10. Click the Download Summary menu link to download a portable copy of your medical information. If you have questions, please visit the Frequently Asked Questions section of the [employer] website. Remember, [employer] is NOT to be used for urgent needs. For medical emergencies, dial 911. Now available from your iPhone and Android! Please provide this summary of care documentation to your next provider. Your primary care clinician is listed as Sarah Rucker. If you have any questions after today's visit, please call [phone].

## 2018-08-08 NOTE — PATIENT INSTRUCTIONS
Dermatitis: Care Instructions  Your Care Instructions  Dermatitis is the general name used for any rash or inflammation of the skin. Different kinds of dermatitis cause different kinds of rashes. Common causes of a rash include new medicines, plants (such as poison oak or poison ivy), heat, and stress. Certain illnesses can also cause a rash. An allergic reaction to something that touches your skin, such as latex, nickel, or poison ivy, is called contact dermatitis. Contact dermatitis may also be caused by something that irritates the skin, such as bleach, a chemical, or soap. These types of rashes cannot be spread from person to person. How long your rash will last depends on what caused it. Rashes may last a few days or months. Follow-up care is a key part of your treatment and safety. Be sure to make and go to all appointments, and call your doctor if you are having problems. It's also a good idea to know your test results and keep a list of the medicines you take. How can you care for yourself at home? · Do not scratch the rash. Cut your nails short, and file them smooth. Or wear gloves if this helps keep you from scratching. · Wash the area with water only. Pat dry. · Put cold, wet cloths on the rash to reduce itching. · Keep cool, and stay out of the sun. · Leave the rash open to the air as much as possible. · If the rash itches, use hydrocortisone cream. Follow the directions on the label. Calamine lotion may help for plant rashes. · Take an over-the-counter antihistamine, such as diphenhydramine (Benadryl) or loratadine (Claritin), to help calm the itching. Read and follow all instructions on the label. · If your doctor prescribed a cream, use it as directed. If your doctor prescribed medicine, take it exactly as directed. When should you call for help?   Call your doctor now or seek immediate medical care if:    · You have symptoms of infection, such as:  ¨ Increased pain, swelling, warmth, or redness. ¨ Red streaks leading from the area. ¨ Pus draining from the area. ¨ A fever.     · You have joint pain along with the rash.    Watch closely for changes in your health, and be sure to contact your doctor if:    · Your rash is changing or getting worse.     · You are not getting better as expected. Where can you learn more? Go to http://loren-renee.info/. Enter ([phone] [phone] in the search box to learn more about \"Dermatitis: Care Instructions. \"  Current as of: October 5, 2017  Content Version: 11.7  © 8493-9004 [employer]. Care instructions adapted under license by [employer] (which disclaims liability or warranty for this information). If you have questions about a medical condition or this instruction, always ask your healthcare professional. [address] any warranty or liability for your use of this information.

## 2018-08-09 LAB
CREAT UR-MCNC: 168.49 MG/DL (ref 30–125)
HCV AB SER IA-ACNC: 0.11 INDEX
HCV AB SERPL QL IA: NEGATIVE
HCV COMMENT,HCGAC: NORMAL
MICROALBUMIN UR-MCNC: 0.7 MG/DL (ref 0–3)
MICROALBUMIN/CREAT UR-RTO: 4 MG/G (ref 0–30)

## 2018-10-23 ENCOUNTER — HOSPITAL ENCOUNTER (OUTPATIENT)
Dept: LAB | Age: 53
Discharge: HOME | End: 2018-10-23
Payer: COMMERCIAL

## 2018-10-23 ENCOUNTER — OFFICE VISIT (OUTPATIENT)
Dept: FAMILY MEDICINE CLINIC | Facility: CLINIC | Age: 53
End: 2018-10-23

## 2018-10-23 VITALS
RESPIRATION RATE: 18 BRPM | BODY MASS INDEX: 28.95 KG/M2 | HEIGHT: 64 IN | DIASTOLIC BLOOD PRESSURE: 81 MMHG | HEART RATE: 66 BPM | SYSTOLIC BLOOD PRESSURE: 134 MMHG | TEMPERATURE: 97.3 F | OXYGEN SATURATION: 94 % | WEIGHT: 169.6 LBS

## 2018-10-23 DIAGNOSIS — E78.49 OTHER HYPERLIPIDEMIA: ICD-10-CM

## 2018-10-23 DIAGNOSIS — Z12.11 SCREEN FOR COLON CANCER: ICD-10-CM

## 2018-10-23 DIAGNOSIS — E66.3 OVERWEIGHT (BMI 25.0-29.9): ICD-10-CM

## 2018-10-23 DIAGNOSIS — Z23 ENCOUNTER FOR IMMUNIZATION: ICD-10-CM

## 2018-10-23 DIAGNOSIS — E11.9 CONTROLLED TYPE 2 DIABETES MELLITUS WITHOUT COMPLICATION, WITHOUT LONG-TERM CURRENT USE OF INSULIN (HCC): Primary | ICD-10-CM

## 2018-10-23 PROBLEM — L30.9 DERMATITIS: Status: RESOLVED | Noted: 2018-08-08 | Resolved: 2018-10-23

## 2018-10-23 PROBLEM — E78.5 HYPERLIPIDEMIA: Status: RESOLVED | Noted: 2018-07-11 | Resolved: 2018-10-23

## 2018-10-23 LAB — HBA1C MFR BLD HPLC: 6 %

## 2018-10-23 PROCEDURE — 82274 ASSAY TEST FOR BLOOD FECAL: CPT | Performed by: INTERNAL MEDICINE

## 2018-10-23 RX ORDER — METFORMIN HYDROCHLORIDE 500 MG/1
500 TABLET ORAL 2 TIMES DAILY WITH MEALS
Qty: 90 TAB | Refills: 3 | Status: SHIPPED | OUTPATIENT
Start: 2018-10-23 | End: 2019-10-21 | Stop reason: SDUPTHER

## 2018-10-23 NOTE — PROGRESS NOTES
Internal Medicine Progress Note    Today's Date:  10/23/2018   Patient:  Elayne Cooley  Patient :  1965    Subjective:     Chief Complaint   Patient presents with    Diabetes    Cholesterol Problem    Hypertension    Immunization/Injection    Medication Refill    Weight Management       Diabetes mellitus  This is a chronic problem. BS is at goal. Pt is on metformin. Pt reports compliance with this medication. Hyperlipidemia  This is a chronic problem. This is at goal. Pt takes lipitor. Pt reports compliance with this medication.      Overweight  This is a chronic problem. This is not at goal. Pt was told about the ketogenic/IF diet during a previous visit. Pt has gained weight since the last visit.      Past Medical History:   Diagnosis Date    Controlled type 2 diabetes mellitus without complication, without long-term current use of insulin ([address] 2018    Dermatitis 2018    Diabetes ([facility]     Hyperlipemia     Hypertension     Overweight (BMI 25.0-29. 9) 2018     Past Surgical History:   Procedure Laterality Date    HX APPENDECTOMY      HX CATARACT REMOVAL Right       reports that  has never smoked. he has never used smokeless tobacco. He reports that he does not drink alcohol or use drugs. Family History   Problem Relation Age of Onset    Heart Disease Mother     Hypertension Mother     Dementia Father      Allergies   Allergen Reactions    Chicken Derived Hives    Shellfish Derived Hives     Review of Systems   Positives in bold  CV:      chest pain, palpitations  PULM:  SOB, wheezing, cough, sputum production    Current Outpatient Meds and Allergies     Current Outpatient Medications on File Prior to Visit   Medication Sig Dispense Refill    lisinopril (PRINIVIL, ZESTRIL) 5 mg tablet TK ONE T PO QD 90 Tab 3    atorvastatin (LIPITOR) 10 mg tablet Take 1 Tab by mouth daily. 90 Tab 3     No current facility-administered medications on file prior to visit. Allergies   Allergen Reactions    Chicken Derived Hives    Shellfish Derived Hives     Objective:     VS:    Visit Vitals  /81 (BP 1 Location: Right arm, BP Patient Position: Sitting)   Pulse 66   Temp 97.3 °F (36.3 °C) (Oral)   Resp 18   Ht 5' 4\" (1.626 m)   Wt 169 lb 9.6 oz (76.9 kg)   SpO2 94%   BMI 29.11 kg/m²     General:   Well-nourished, well-groomed, pleasant, alert, in no acute distress  Head:  Normocephalic, atraumatic  Ears:  External ears WNL  Nose:  External nares WNL  Psych:  No pressured speech, no abnormal thought content    PHQ over the last two weeks 2018   Little interest or pleasure in doing things Not at all   Feeling down, depressed, irritable, or hopeless Not at all   Total Score PHQ 2 0     Lab Results   Component Value Date/Time    Glucose 135 (H) 2018 08:35 AM    Glucose  2017 02:40 PM    Microalbumin/Creat ratio (mg/g creat) 4 2018 10:41 AM    Microalbumin,urine random 0.70 2018 10:41 AM    LDL, calculated 54.8 2018 08:35 AM    Creatinine 0.94 2018 08:35 AM      Assessment/Plan & Orders:         ICD-10-CM ICD-9-CM    1. Controlled type 2 diabetes mellitus without complication, without long-term current use of insulin (HCC) E11.9 250.00 AMB POC HEMOGLOBIN A1C      metFORMIN (GLUCOPHAGE) 500 mg tablet   2. Overweight (BMI 25.0-29. 9) E66.3 278.02    3. Other hyperlipidemia E78.49 272.4    4. Encounter for immunization Z23 V03.89 INFLUENZA VIRUS VAC QUAD,SPLIT,PRESV FREE SYRINGE IM     Healthy lifestyle has been encouraged including avoidance of tobacco, limiting or avoiding alcohol intake, heart healthy diet which is low in cholesterol and saturated fat and contains fresh fruits, vegetables and whole grains and fiber, regular exercise with goals of 20-30 minutes 3-5 days weekly and maintaining an optimal BMI.    Continue ketogenic/IF diet  Mederma for skin discoloration  Depression screenin18    Follow-up Disposition:  Return in about 3 months (around 1/23/2019) for Follow up hypertension, Follow up hyperlipidemia. *Patient verbalized understanding and agreement with the plan. Patient was given an after-visit summary. Nico Oconnell.  [address], MD - Internal Medicine  10/23/2018, 10:46 AM  [facility]  [address] [place], [address]  Phone [phone]  Fax [phone]

## 2018-10-23 NOTE — PATIENT INSTRUCTIONS
Vaccine Information Statement    Influenza (Flu) Vaccine (Inactivated or Recombinant): What you need to know    Many Vaccine Information Statements are available in Yoruba and other languages. See www.immunize.org/vis  Hojas de Información Sobre Vacunas están disponibles en Español y en muchos otros idiomas. Visite www.immunize.org/vis    1. Why get vaccinated? Influenza (flu) is a contagious disease that spreads around the [place] every year, usually between October and May. Flu is caused by influenza viruses, and is spread mainly by coughing, sneezing, and close contact. Anyone can get flu. Flu strikes suddenly and can last several days. Symptoms vary by age, but can include:   fever/chills   sore throat   muscle aches   fatigue   cough   headache    runny or stuffy nose    Flu can also lead to pneumonia and blood infections, and cause diarrhea and seizures in children. If you have a medical condition, such as heart or lung disease, flu can make it worse. Flu is more dangerous for some people. Infants and young children, people 72years of age and older, pregnant women, and people with certain health conditions or a weakened immune system are at greatest risk. Each year thousands of people in the [facility] die from flu, and many more are hospitalized. Flu vaccine can:   keep you from getting flu,   make flu less severe if you do get it, and   keep you from spreading flu to your family and other people. 2. Inactivated and recombinant flu vaccines    A dose of flu vaccine is recommended every flu season. Children 6 months through 6years of age may need two doses during the same flu season. Everyone else needs only one dose each flu season.        Some inactivated flu vaccines contain a very small amount of a mercury-based preservative called thimerosal. Studies have not shown thimerosal in vaccines to be harmful, but flu vaccines that do not contain thimerosal are available. There is no live flu virus in flu shots. They cannot cause the flu. There are many flu viruses, and they are always changing. Each year a new flu vaccine is made to protect against three or four viruses that are likely to cause disease in the upcoming flu season. But even when the vaccine doesnt exactly match these viruses, it may still provide some protection    Flu vaccine cannot prevent:   flu that is caused by a virus not covered by the vaccine, or   illnesses that look like flu but are not. It takes about 2 weeks for protection to develop after vaccination, and protection lasts through the flu season. 3. Some people should not get this vaccine    Tell the person who is giving you the vaccine:     If you have any severe, life-threatening allergies. If you ever had a life-threatening allergic reaction after a dose of flu vaccine, or have a severe allergy to any part of this vaccine, you may be advised not to get vaccinated. Most, but not all, types of flu vaccine contain a small amount of egg protein.  If you ever had Guillain-Barré Syndrome (also called GBS). Some people with a history of GBS should not get this vaccine. This should be discussed with your doctor.  If you are not feeling well. It is usually okay to get flu vaccine when you have a mild illness, but you might be asked to come back when you feel better. 4. Risks of a vaccine reaction    With any medicine, including vaccines, there is a chance of reactions. These are usually mild and go away on their own, but serious reactions are also possible. Most people who get a flu shot do not have any problems with it.      Minor problems following a flu shot include:    soreness, redness, or swelling where the shot was given     hoarseness   sore, red or itchy eyes   cough   fever   aches   headache   itching   fatigue  If these problems occur, they usually begin soon after the shot and last 1 or 2 days. More serious problems following a flu shot can include the following:     There may be a small increased risk of Guillain-Barré Syndrome (GBS) after inactivated flu vaccine. This risk has been estimated at 1 or 2 additional cases per million people vaccinated. This is much lower than the risk of severe complications from flu, which can be prevented by flu vaccine.  Young children who get the flu shot along with pneumococcal vaccine (PCV13) and/or DTaP vaccine at the same time might be slightly more likely to have a seizure caused by fever. Ask your doctor for more information. Tell your doctor if a child who is getting flu vaccine has ever had a seizure. Problems that could happen after any injected vaccine:      People sometimes faint after a medical procedure, including vaccination. Sitting or lying down for about 15 minutes can help prevent fainting, and injuries caused by a fall. Tell your doctor if you feel dizzy, or have vision changes or ringing in the ears.  Some people get severe pain in the shoulder and have difficulty moving the arm where a shot was given. This happens very rarely.  Any medication can cause a severe allergic reaction. Such reactions from a vaccine are very rare, estimated at about 1 in a million doses, and would happen within a few minutes to a few hours after the vaccination. As with any medicine, there is a very remote chance of a vaccine causing a serious injury or death. The safety of vaccines is always being monitored. For more information, visit: www.cdc.gov/vaccinesafety/    5. What if there is a serious reaction? What should I look for?  Look for anything that concerns you, such as signs of a severe allergic reaction, very high fever, or unusual behavior.     Signs of a severe allergic reaction can include hives, swelling of the face and throat, difficulty breathing, a fast heartbeat, dizziness, and weakness - usually within a few minutes to a few hours after the vaccination. What should I do?  If you think it is a severe allergic reaction or other emergency that cant wait, call 9-1-1 and get the person to the nearest hospital. Otherwise, call your doctor.  Reactions should be reported to the Vaccine Adverse Event Reporting System (VAERS). Your doctor should file this report, or you can do it yourself through  the VAERS web site at www.vaers. [facility].gov, or by calling 5-465.189.8656. VAERS does not give medical advice. 6. The National Vaccine Injury Compensation Program    The [facility] Vaccine Injury Compensation Program (VICP) is a federal program that was created to compensate people who may have been injured by certain vaccines. Persons who believe they may have been injured by a vaccine can learn about the program and about filing a claim by calling 1-990.898.2862 or visiting the [employer] website at www.[facility].gov/vaccinecompensation. There is a time limit to file a claim for compensation. 7. How can I learn more?  Ask your healthcare provider. He or she can give you the vaccine package insert or suggest other sources of information.  Call your local or state health department.  Contact the Centers for Disease Control and Prevention (CDC):  - Call 9-104.801.2158 (1-800-CDC-INFO) or  - Visit CDCs website at www.cdc.gov/flu    Vaccine Information Statement   Inactivated Influenza Vaccine   8/7/2015  [address]areth [id number]    Department of Health and Human Services  Centers for Disease Control and Prevention    Office Use Only

## 2018-10-23 NOTE — PROGRESS NOTES
Hema Mesa is 46 y.o. male presents today for office visit for follow up for diabetes. Pt is not fasting. Pt stated that his blood sugar was 200 this morning but he hasn't taken Metformin in 2 weeks. Pt is in Room# 3.    1. Have you been to the ER, urgent care clinic since your last visit? Hospitalized since your last visit? no  2. Have you seen or consulted any other health care providers outside of the [address] [place] [address] since your last visit? Include any pap smears or colon screening. Yes. Dermatology 8/2018  Health Maintenance reviewed and pt returned FIT at this visit. Upcoming Appts  none    Requested Prescriptions     Pending Prescriptions Disp Refills    metFORMIN (GLUCOPHAGE) 500 mg tablet 180 Tab 3     Sig: Take 1 Tab by mouth two (2) times daily (with meals). Visit Vitals  Resp 18   Ht 5' 4\" (1.626 m)   Wt 169 lb 9.6 oz (76.9 kg)   BMI 29.11 kg/m²         Hema Mesa is a 46 y.o. male who presents for routine immunizations. He denies any symptoms , reactions or allergies that would exclude them from being immunized today. Risks and adverse reactions were discussed and the VIS was given to them. All questions were addressed. He was observed for 10 min post injection. There were no reactions observed.     Venkat Yan LPN

## 2018-10-29 LAB — HEMOCCULT STL QL IA: NEGATIVE

## 2019-01-22 ENCOUNTER — OFFICE VISIT (OUTPATIENT)
Dept: FAMILY MEDICINE CLINIC | Facility: CLINIC | Age: 54
End: 2019-01-22

## 2019-01-22 VITALS
SYSTOLIC BLOOD PRESSURE: 128 MMHG | HEIGHT: 64 IN | HEART RATE: 67 BPM | TEMPERATURE: 98.3 F | DIASTOLIC BLOOD PRESSURE: 77 MMHG | OXYGEN SATURATION: 94 % | WEIGHT: 170.8 LBS | RESPIRATION RATE: 16 BRPM | BODY MASS INDEX: 29.16 KG/M2

## 2019-01-22 DIAGNOSIS — M25.511 ACUTE PAIN OF RIGHT SHOULDER: Primary | ICD-10-CM

## 2019-01-22 DIAGNOSIS — E66.3 OVERWEIGHT (BMI 25.0-29.9): ICD-10-CM

## 2019-01-22 DIAGNOSIS — E78.49 OTHER HYPERLIPIDEMIA: ICD-10-CM

## 2019-01-22 DIAGNOSIS — E11.9 CONTROLLED TYPE 2 DIABETES MELLITUS WITHOUT COMPLICATION, WITHOUT LONG-TERM CURRENT USE OF INSULIN (HCC): ICD-10-CM

## 2019-01-22 RX ORDER — IBUPROFEN 800 MG/1
800 TABLET ORAL
Qty: 30 TAB | Refills: 0 | Status: SHIPPED | OUTPATIENT
Start: 2019-01-22 | End: 2020-01-13

## 2019-01-22 NOTE — PATIENT INSTRUCTIONS
Shoulder Stretches: Exercises Your Care Instructions Here are some examples of exercises for your shoulder. Start each exercise slowly. Ease off the exercise if you start to have pain. Your doctor or physical therapist will tell you when you can start these exercises and which ones will work best for you. How to do the exercises Shoulder stretch 1.  a doorway and place one arm against the door frame. Your elbow should be a little higher than your shoulder. 2. Relax your shoulders as you lean forward, allowing your chest and shoulder muscles to stretch. You can also turn your body slightly away from your arm to stretch the muscles even more. 3. Hold for 15 to 30 seconds. 4. Repeat 2 to 4 times with each arm. Shoulder and chest stretch 1. Shoulder and chest stretch 2. While sitting, relax your upper body so you slump slightly in your chair. 3. As you breathe in, straighten your back and open your arms out to the sides. 4. Gently pull your shoulder blades back and downward. 5. Hold for 15 to 30 seconds as your breathe normally. 6. Repeat 2 to 4 times. Overhead stretch 1. Reach up over your head with both arms. 2. Hold for 15 to 30 seconds. 3. Repeat 2 to 4 times. Follow-up care is a key part of your treatment and safety. Be sure to make and go to all appointments, and call your doctor if you are having problems. It's also a good idea to know your test results and keep a list of the medicines you take. Where can you learn more? Go to http://loren-renee.info/. Enter S254 in the search box to learn more about \"Shoulder Stretches: Exercises. \" Current as of: September 20, 2018 Content Version: 11.9 © 2729-2378 [employer]. Care instructions adapted under license by [employer] (which disclaims liability or warranty for this information).  If you have questions about a medical condition or this instruction, always ask your healthcare professional. [place] [address] any warranty or liability for your use of this information.

## 2019-01-22 NOTE — PROGRESS NOTES
Internal Medicine Progress Note Today's Date:  2019 Patient:  Mera Rivas Patient :  1965 Subjective: Chief Complaint Patient presents with  Cholesterol Problem  Hypertension  Arm Pain  
  x 1 month  
   
Diabetes mellitus This is a chronic problem. BS is at goal. Pt is on metformin. Pt reports compliance with this medication. Hyperlipidemia This is a chronic problem. This is at goal. Pt takes lipitor. Pt reports compliance with this medication.  
  
Overweight This is a chronic problem. This is not at goal. Pt was told about the ketogenic/IF diet during a previous visit. Pt has gained weight since the last visit. Right shoulder pain This is an acute problem. This is not at goal. This has been present for a month. Pt tried an OTC medication that is not working. Pt denies trauma or injury.  
  
Past Medical History:  
Diagnosis Date  Controlled type 2 diabetes mellitus without complication, without long-term current use of insulin ([address] 2018  Dermatitis 2018  Diabetes ([facility] [address]  Hyperlipemia  Hypertension  Overweight (BMI 25.0-29. 9) 2018 Past Surgical History:  
Procedure Laterality Date  HX APPENDECTOMY  HX CATARACT REMOVAL Right   
 
 reports that  has never smoked. he has never used smokeless tobacco. He reports that he does not drink alcohol or use drugs. Family History Problem Relation Age of Onset  Heart Disease Mother  Hypertension Mother  Dementia Father Allergies Allergen Reactions  Chicken Derived Hives  Shellfish Derived Hives Review of Systems Positives in bold CV:      chest pain, palpitations PULM:  SOB, wheezing, cough, sputum production Current Outpatient Meds and Allergies Current Outpatient Medications on File Prior to Visit Medication Sig Dispense Refill  metFORMIN (GLUCOPHAGE) 500 mg tablet Take 1 Tab by mouth two (2) times daily (with meals). 90 Tab 3  
 lisinopril (PRINIVIL, ZESTRIL) 5 mg tablet TK ONE T PO QD 90 Tab 3  
 atorvastatin (LIPITOR) 10 mg tablet Take 1 Tab by mouth daily. 90 Tab 3 No current facility-administered medications on file prior to visit. Allergies Allergen Reactions  Chicken Derived Hives  Shellfish Derived Hives Objective:    
VS:   
Visit Vitals /77 (BP 1 Location: Left arm, BP Patient Position: Sitting) Pulse 67 Temp 98.3 °F (36.8 °C) (Oral) Resp 16 Ht 5' 4\" (1.626 m) Wt 170 lb 12.8 oz (77.5 kg) SpO2 94% BMI 29.32 kg/m² General:   Well-nourished, well-groomed, pleasant, alert, in no acute distress Head:  Normocephalic, atraumatic Ears:  External ears WNL Nose:  External nares WNL Psych:  No pressured speech, no abnormal thought content PHQ over the last two weeks 7/11/2018 Little interest or pleasure in doing things Not at all Feeling down, depressed, irritable, or hopeless Not at all Total Score PHQ 2 0 Lab Results Component Value Date/Time Glucose 135 (H) 07/16/2018 08:35 AM  
 Glucose  12/06/2017 02:40 PM  
 Microalbumin/Creat ratio (mg/g creat) 4 08/08/2018 10:41 AM  
 Microalbumin,urine random 0.70 08/08/2018 10:41 AM  
 LDL, calculated 54.8 07/16/2018 08:35 AM  
 Creatinine 0.94 07/16/2018 08:35 AM  
  
Assessment/Plan & Orders: ICD-10-CM ICD-9-CM 1. Acute pain of right shoulder M25.511 719.41 ibuprofen (MOTRIN) 800 mg tablet 2. Controlled type 2 diabetes mellitus without complication, without long-term current use of insulin (HCC) E11.9 250.00   
3. Overweight (BMI 25.0-29. 9) E66.3 278.02   
4. Other hyperlipidemia E78.49 272.4 Healthy lifestyle has been encouraged including avoidance of tobacco, limiting or avoiding alcohol intake, heart healthy diet which is low in cholesterol and saturated fat and contains fresh fruits, vegetables and whole grains and fiber, regular exercise with goals of 20-30 minutes 3-5 days weekly and maintaining an optimal BMI. Information regarding the ketogenic/IF diet with exercise was given during a previous visit Pt declines an A1c check today and would like to check next time Continue current medications Depression screenin18 Follow-up Disposition: 
Return in about 4 weeks (around 2019) for Pain, Diabetes mellitus, Hypertension, Weight management. *Patient verbalized understanding and agreement with the plan. Patient was given an after-visit summary. Nery Barillas. Elizabeth Gao MD - Internal Medicine 2019, 10:46 AM 
[facility] [address] [facility], [address] Phone [phone] Fax [phone]

## 2019-01-22 NOTE — PROGRESS NOTES
Ruth Cagle is 48 y.o. male presents today for office visit for follow up for hypertension. Pt c/o right shoulder pain x 1 month. Pt is not fasting. Pt is in Room# 3. 
1. Have you been to the ER, urgent care clinic since your last visit? Hospitalized since your last visit? no 
 
2. Have you seen or consulted any other health care providers outside of the [address] [place] [address] since your last visit? Include any pap smears or colon screening. no 
 
Health Maintenance reviewed. Upcoming Appts 
none Requested Prescriptions No prescriptions requested or ordered in this encounter Visit Vitals /77 (BP 1 Location: Left arm, BP Patient Position: Sitting) Pulse 67 Temp 98.3 °F (36.8 °C) (Oral) Resp 16 Ht 5' 4\" (1.626 m) Wt 170 lb 12.8 oz (77.5 kg) SpO2 94% BMI 29.32 kg/m²

## 2019-02-01 DIAGNOSIS — E11.9 DIABETES MELLITUS WITHOUT COMPLICATION (HCC): ICD-10-CM

## 2019-02-01 RX ORDER — ATORVASTATIN CALCIUM 10 MG/1
10 TABLET, FILM COATED ORAL DAILY
Qty: 90 TAB | Refills: 3 | OUTPATIENT
Start: 2019-02-01

## 2019-02-01 RX ORDER — LISINOPRIL 5 MG/1
TABLET ORAL
Qty: 90 TAB | Refills: 3 | OUTPATIENT
Start: 2019-02-01

## 2019-02-01 NOTE — TELEPHONE ENCOUNTER
Medications were sent to the pharmacy last August for a year supply. Has the pt requested a refill from his pharmacy? Also needs a follow up appt.

## 2019-03-26 ENCOUNTER — TELEPHONE (OUTPATIENT)
Dept: FAMILY MEDICINE CLINIC | Facility: CLINIC | Age: 54
End: 2019-03-26

## 2019-03-26 DIAGNOSIS — E11.9 CONTROLLED TYPE 2 DIABETES MELLITUS WITHOUT COMPLICATION, WITHOUT LONG-TERM CURRENT USE OF INSULIN (HCC): Primary | ICD-10-CM

## 2019-07-15 ENCOUNTER — HOSPITAL ENCOUNTER (OUTPATIENT)
Dept: LAB | Age: 54
Discharge: HOME | End: 2019-07-15
Payer: SELF-PAY

## 2019-07-15 DIAGNOSIS — E11.9 CONTROLLED TYPE 2 DIABETES MELLITUS WITHOUT COMPLICATION, WITHOUT LONG-TERM CURRENT USE OF INSULIN (HCC): ICD-10-CM

## 2019-07-15 LAB
ANION GAP SERPL CALC-SCNC: 9 MMOL/L (ref 3–18)
BUN SERPL-MCNC: 13 MG/DL (ref 7–18)
BUN/CREAT SERPL: 14 (ref 12–20)
CALCIUM SERPL-MCNC: 8.8 MG/DL (ref 8.5–10.1)
CHLORIDE SERPL-SCNC: 106 MMOL/L (ref 100–108)
CHOLEST SERPL-MCNC: 125 MG/DL
CO2 SERPL-SCNC: 25 MMOL/L (ref 21–32)
CREAT SERPL-MCNC: 0.9 MG/DL (ref 0.6–1.3)
EST. AVERAGE GLUCOSE BLD GHB EST-MCNC: 131 MG/DL
GLUCOSE SERPL-MCNC: 156 MG/DL (ref 74–99)
HBA1C MFR BLD: 6.2 % (ref 4.2–5.6)
HDLC SERPL-MCNC: 36 MG/DL (ref 40–60)
HDLC SERPL: 3.5 {RATIO} (ref 0–5)
LDLC SERPL CALC-MCNC: 70 MG/DL (ref 0–100)
LIPID PROFILE,FLP: ABNORMAL
POTASSIUM SERPL-SCNC: 3.8 MMOL/L (ref 3.5–5.5)
SODIUM SERPL-SCNC: 140 MMOL/L (ref 136–145)
TRIGL SERPL-MCNC: 95 MG/DL (ref ?–150)
VLDLC SERPL CALC-MCNC: 19 MG/DL

## 2019-07-15 PROCEDURE — 80048 BASIC METABOLIC PNL TOTAL CA: CPT

## 2019-07-15 PROCEDURE — 83036 HEMOGLOBIN GLYCOSYLATED A1C: CPT

## 2019-07-15 PROCEDURE — 80061 LIPID PANEL: CPT

## 2019-07-22 ENCOUNTER — HOSPITAL ENCOUNTER (OUTPATIENT)
Dept: LAB | Age: 54
Discharge: HOME | End: 2019-07-22
Payer: SELF-PAY

## 2019-07-22 ENCOUNTER — OFFICE VISIT (OUTPATIENT)
Dept: FAMILY MEDICINE CLINIC | Facility: CLINIC | Age: 54
End: 2019-07-22

## 2019-07-22 VITALS
TEMPERATURE: 98 F | BODY MASS INDEX: 28.79 KG/M2 | HEIGHT: 64 IN | WEIGHT: 168.6 LBS | RESPIRATION RATE: 15 BRPM | HEART RATE: 63 BPM | DIASTOLIC BLOOD PRESSURE: 80 MMHG | OXYGEN SATURATION: 96 % | SYSTOLIC BLOOD PRESSURE: 122 MMHG

## 2019-07-22 DIAGNOSIS — E11.9 CONTROLLED TYPE 2 DIABETES MELLITUS WITHOUT COMPLICATION, WITHOUT LONG-TERM CURRENT USE OF INSULIN (HCC): ICD-10-CM

## 2019-07-22 DIAGNOSIS — Z13.31 SCREENING FOR DEPRESSION: ICD-10-CM

## 2019-07-22 DIAGNOSIS — Z00.00 ROUTINE GENERAL MEDICAL EXAMINATION AT A HEALTH CARE FACILITY: Primary | ICD-10-CM

## 2019-07-22 DIAGNOSIS — Z12.11 SCREEN FOR COLON CANCER: ICD-10-CM

## 2019-07-22 DIAGNOSIS — M79.621 PAIN IN RIGHT UPPER ARM: ICD-10-CM

## 2019-07-22 DIAGNOSIS — M54.2 NECK PAIN: ICD-10-CM

## 2019-07-22 LAB
CREAT UR-MCNC: 75 MG/DL (ref 30–125)
MICROALBUMIN UR-MCNC: 0.51 MG/DL (ref 0–3)
MICROALBUMIN/CREAT UR-RTO: 7 MG/G (ref 0–30)

## 2019-07-22 PROCEDURE — 82043 UR ALBUMIN QUANTITATIVE: CPT

## 2019-07-22 RX ORDER — ASPIRIN 81 MG/1
81 TABLET ORAL DAILY
Qty: 90 TAB | Refills: 3 | Status: SHIPPED | OUTPATIENT
Start: 2019-07-22 | End: 2019-10-21

## 2019-07-22 NOTE — PROGRESS NOTES
History and Physical    Today's Date:  2019   Patient's Name: Becky Perkins   Patient's :  1965     History:     Chief Complaint   Patient presents with    Hypertension    Weight Management    Diabetes     BS: 205 last week     Shoulder Pain     right f/u     Right UE/neck pain  This is a new problem. This is not controlled. Pt takes motrin prn. Pt notes decreased ROM with right UE. Past Medical History:   Diagnosis Date    Controlled type 2 diabetes mellitus without complication, without long-term current use of insulin ([address] 2018    Dermatitis 2018    Diabetes ([address]     Hyperlipemia     Hypertension     Overweight (BMI 25.0-29. 9) 2018     Past Surgical History:   Procedure Laterality Date    HX APPENDECTOMY      HX CATARACT REMOVAL Right       reports that he has never smoked. He has never used smokeless tobacco. He reports that he does not drink alcohol or use drugs. Family History   Problem Relation Age of Onset    Heart Disease Mother     Hypertension Mother     Dementia Father      Allergies   Allergen Reactions    Chicken Derived Hives    Shellfish Derived Hives     Problem List:      Patient Active Problem List   Diagnosis Code    Controlled type 2 diabetes mellitus without complication, without long-term current use of insulin ([address] E11.9    Other hyperlipidemia E78.49    Overweight (BMI 25.0-29. 9) E66.3     Medications:     Current Outpatient Medications   Medication Sig    aspirin delayed-release 81 mg tablet Take 1 Tab by mouth daily.  ibuprofen (MOTRIN) 800 mg tablet Take 1 Tab by mouth every eight (8) hours as needed for Pain.  metFORMIN (GLUCOPHAGE) 500 mg tablet Take 1 Tab by mouth two (2) times daily (with meals).  lisinopril (PRINIVIL, ZESTRIL) 5 mg tablet TK ONE T PO QD    atorvastatin (LIPITOR) 10 mg tablet Take 1 Tab by mouth daily. No current facility-administered medications for this visit.       Review of Systems: General:   fevers, chills  Neurologic: dizziness, lightheadedness  Eyes:  vision changes, double vision, photophobia  Ears:  change in hearing, ear pain, ear discharge, ear ringing  Nose:  sneezing, runny nose  Mouth/Throat: sore throat, voice change  Neck:  pain, stiffness  Respiratory: dyspnea at rest, dyspnea on exertion, wheezing, cough, sputum production  Cardiovascular:   chest pain, palpitations  Gastrointestinal:  nausea, vomiting  Urinary: dysuria, urinary frequency, nocturia, malodorous urine, difficulty initiating flow, slow urine stream  Genital (M): penile discharge, ulcerations  Musculoskeletal:  joint pain (right arm),  back pain  Psychiatric: insomnia, anxiety  Endocrine: cold intolerance, heat intolerance  Hematologic: easy bruising, easy bleeding  Dermatologic: Itching, rash    Physical Assessment:   Visit Vitals  /80 (BP 1 Location: Left arm, BP Patient Position: Sitting)   Pulse 63   Temp 98 °F (36.7 °C) (Oral)   Resp 15   Ht 5' 4\" (1.626 m)   Wt 168 lb 9.6 oz (76.5 kg)   SpO2 96%   BMI 28.94 kg/m²     General:   Well-groomed, well-nourished, in no distress, pleasant, appropriate and conversant. Eyes:    PERRL, oropharynx clear  Mouth:   oropharynx WNL without membranes, exudates, petechiae or ulcers  Cardiovascular:   RRR, no MRG. Pulmonary:   Lungs clear bilaterally. Normal respiratory effort. Abdomen:   Abdomen soft, NT, ND,  Extremities:   No edema, LEs warm and well-perfused. Neuro:   Alert, no focal deficits. No facial asymmetry noted.   Skin:    No jaundice, no rash  MSK:   Normal ROM, 5/5 muscle strength  Psych:  No pressured speech or abnormal thought content    Lab Results   Component Value Date/Time    Hemoglobin A1c 6.2 (H) 07/15/2019 08:19 AM    Hemoglobin A1c (POC) 6 10/23/2018 09:00 AM     Results for orders placed or performed in visit on 07/11/18   AMB POC URINALYSIS DIP STICK AUTO W/ MICRO     Status: None   Result Value Ref Range Status    Color (UA POC) Yellow Final    Clarity (UA POC) Clear  Final    Glucose (UA POC) 2+ Negative Final    Bilirubin (UA POC) Negative Negative Final    Ketones (UA POC) Negative Negative Final    Specific gravity (UA POC) 1.030 1.001 - 1.035 Final    Blood (UA POC) 1+ Negative Final    pH (UA POC) 6 4.6 - 8.0 Final    Protein (UA POC) Negative Negative Final    Urobilinogen (UA POC) 0.2 mg/dL 0.2 - 1 Final    Nitrites (UA POC) Negative Negative Final    Leukocyte esterase (UA POC) Negative Negative Final     Assessment/Plan & Orders:         ICD-10-CM ICD-9-CM    1. Routine general medical examination at a health care facility Z00.00 V70.0    2. Neck pain M54.2 723.1 REFERRAL TO SPORTS MEDICINE   3. Pain in right upper arm M79.621 729.5 REFERRAL TO SPORTS MEDICINE   4. Controlled type 2 diabetes mellitus without complication, without long-term current use of insulin (HCC) E11.9 250.00 MICROALBUMIN, UR, RAND W/ MICROALB/CREAT RATIO   5. Screen for colon cancer Z12.11 V76.51 REFERRAL FOR COLONOSCOPY   6. Screening for depression Z13.31 V79.0 VT DEPRESSION SCREEN ANNUAL      HM  Colon cancer: Colonoscopy due  Influenza vaccine: complete  Pneumococcal vaccine: due at age 72   Tdap: unknown  Herpes Zoster vaccine: due at age 61  Hep B vaccine: not indicated (liver dz, DM 19-59)  Weight:  Body mass index is 28.94 kg/m². Discussed the patient's BMI with him. The BMI follow up plan is as follows: improve diet and exercise at least 30 min a day five times a week  Prostate cancer:  Discuss re: screening with PSA between ages 48 and 71  Osteoporosis:  No indication for dexa scan    Information given on ketogenic/IF diet during a previous visit  Pt to get eye exam in August. Form given       Follow-up and Dispositions    · Return in about 3 months (around 10/22/2019) for Weight management, Hyperlipidemia, Diabetes mellitus, Go over lab/imaging results. *Patient verbalized understanding and agreement with the plan.   Patient was given an after-visit summary. Waldo Salazar.  Sonam Linn MD - Internal Medicine  7/22/2019, 9:56 AM  [facility]  [address] [place], [address]  Phone [phone]  Fax [phone]

## 2019-07-22 NOTE — PATIENT INSTRUCTIONS
Diabetes Foot Health: Care Instructions  Your Care Instructions    When you have diabetes, your feet need extra care and attention. Diabetes can damage the nerve endings and blood vessels in your feet, making you less likely to notice when your feet are injured. Diabetes also limits your body's ability to fight infection and get blood to areas that need it. If you get a minor foot injury, it could become an ulcer or a serious infection. With good foot care, you can prevent most of these problems. Caring for your feet can be quick and easy. Most of the care can be done when you are bathing or getting ready for bed. Follow-up care is a key part of your treatment and safety. Be sure to make and go to all appointments, and call your doctor if you are having problems. It's also a good idea to know your test results and keep a list of the medicines you take. How can you care for yourself at home? · Keep your blood sugar close to normal by watching what and how much you eat, monitoring blood sugar, taking medicines if prescribed, and getting regular exercise. · Do not smoke. Smoking affects blood flow and can make foot problems worse. If you need help quitting, talk to your doctor about stop-smoking programs and medicines. These can increase your chances of quitting for good. · Eat a diet that is low in fats. High fat intake can cause fat to build up in your blood vessels and decrease blood flow. · Inspect your feet daily for blisters, cuts, cracks, or sores. If you cannot see well, use a mirror or have someone help you. · Take care of your feet:  ? Wash your feet every day. Use warm (not hot) water. Check the water temperature with your wrists or other part of your body, not your feet. ? Dry your feet well. Pat them dry. Do not rub the skin on your feet too hard. Dry well between your toes. If the skin on your feet stays moist, bacteria or a fungus can grow, which can lead to infection. ?  Keep your skin soft. Use moisturizing skin cream to keep the skin on your feet soft and prevent calluses and cracks. But do not put the cream between your toes, and stop using any cream that causes a rash. ? Clean underneath your toenails carefully. Do not use a sharp object to clean underneath your toenails. Use the blunt end of a nail file or other rounded tool. ? Trim and file your toenails straight across to prevent ingrown toenails. Use a nail clipper, not scissors. Use an emery board to smooth the edges. · Change socks daily. Socks without seams are best, because seams often rub the feet. You can find socks for people with diabetes from specialty catalogs. · Look inside your shoes every day for things like gravel or torn linings, which could cause blisters or sores. · Buy shoes that fit well:  ? Look for shoes that have plenty of space around the toes. This helps prevent bunions and blisters. ? Try on shoes while wearing the kind of socks you will usually wear with the shoes. ? Avoid plastic shoes. They may rub your feet and cause blisters. Good shoes should be made of materials that are flexible and breathable, such as leather or cloth. ? Break in new shoes slowly by wearing them for no more than an hour a day for several days. Take extra time to check your feet for red areas, blisters, or other problems after you wear new shoes. · Do not go barefoot. Do not wear sandals, and do not wear shoes with very thin soles. Thin soles are easy to puncture. They also do not protect your feet from hot pavement or cold weather. · Have your doctor check your feet during each visit. If you have a foot problem, see your doctor. Do not try to treat an early foot problem at home. Home remedies or treatments that you can buy without a prescription (such as corn removers) can be harmful. · Always get early treatment for foot problems. A minor irritation can lead to a major problem if not properly cared for early.   When should you call for help? Call your doctor now or seek immediate medical care if:    · You have a foot sore, an ulcer or break in the skin that is not healing after 4 days, bleeding corns or calluses, or an ingrown toenail.     · You have blue or black areas, which can mean bruising or blood flow problems.     · You have peeling skin or tiny blisters between your toes or cracking or oozing of the skin.     · You have a fever for more than 24 hours and a foot sore.     · You have new numbness or tingling in your feet that does not go away after you move your feet or change positions.     · You have unexplained or unusual swelling of the foot or ankle.    Watch closely for changes in your health, and be sure to contact your doctor if:    · You cannot do proper foot care. Where can you learn more? Go to http://loren-renee.info/. Enter A739 in the search box to learn more about \"Diabetes Foot Health: Care Instructions. \"  Current as of: July 25, 2018  Content Version: 12.1  © 9783-9811 [employer]. Care instructions adapted under license by "[employer] (which disclaims liability or warranty for this information). If you have questions about a medical condition or this instruction, always ask your healthcare professional. [address] any warranty or liability for your use of this information.

## 2019-07-22 NOTE — PROGRESS NOTES
Arline Haile is a 48 y.o.  male presents today for office visit for follow up. Pt would also like to discuss shoulder pain. Pt is not fasting. Pt is in Room # 3      1. Have you been to the ER, urgent care clinic since your last visit? Hospitalized since your last visit? No    2. Have you seen or consulted any other health care providers outside of the [address] [place] [address] since your last visit? Include any pap smears or colon screening. No    Upcoming Appts  none    Health Maintenance reviewed       VORB: No orders of the defined types were placed in this encounter.   Byran Meng, CARENN

## 2019-08-22 ENCOUNTER — OFFICE VISIT (OUTPATIENT)
Dept: ORTHOPEDIC SURGERY | Age: 54
End: 2019-08-22

## 2019-08-22 VITALS
HEIGHT: 64 IN | RESPIRATION RATE: 15 BRPM | WEIGHT: 168 LBS | HEART RATE: 82 BPM | SYSTOLIC BLOOD PRESSURE: 150 MMHG | TEMPERATURE: 98.4 F | DIASTOLIC BLOOD PRESSURE: 80 MMHG | BODY MASS INDEX: 28.68 KG/M2

## 2019-08-22 DIAGNOSIS — S46.011A ROTATOR CUFF STRAIN, RIGHT, INITIAL ENCOUNTER: ICD-10-CM

## 2019-08-22 DIAGNOSIS — M75.41 SHOULDER IMPINGEMENT, RIGHT: Primary | ICD-10-CM

## 2019-08-22 RX ORDER — METHYLPREDNISOLONE ACETATE 40 MG/ML
40 INJECTION, SUSPENSION INTRA-ARTICULAR; INTRALESIONAL; INTRAMUSCULAR; SOFT TISSUE ONCE
Qty: 1 VIAL | Refills: 0
Start: 2019-08-22 | End: 2019-08-22

## 2019-08-22 RX ORDER — MELOXICAM 15 MG/1
TABLET ORAL
Qty: 90 TAB | Refills: 0 | Status: SHIPPED | OUTPATIENT
Start: 2019-08-22 | End: 2020-01-13

## 2019-08-22 NOTE — LETTER
NAME: Tonya Dudley [address] [place] [address] : 1965 MRN: [id number] CONSENT FOR TREATMENT/PROCEDURE I authorize Taqueria Giang DO at [facility] to perform the medical treatment and/or procedure(s) described below:  
 
Description of Medical Treatment and/or Procedure(s): (Specify number of treatments or procedures if repeated treatment is recommended) 
 
_____________inject steroid into right shoulder subacromial_________________________ I understand my provider may be assisted with significant procedural tasks by other qualified medical practitioners, who may perform important parts of the treatment/procedure(s) or assist with the administration of analgesia (pain-relieving medications) as necessary for my treatment/procedure(s). These practitioners will only perform tasks within the scope of their licensure and practice, as determined under [place] law and any other applicable regulation(s). Name and Credentials of Practitioners Who May Assist with My Treatment/Procedure(s):  
 
______________________________________________________________________ I understand that a medical company representative may be present during my treatment/procedure(s) to observe and provide verbal, technical advice to my provider. I consent to the participation of this representative in my treatment/procedure(s). My provider has explained that unforeseen conditions may be identified during the performance of my treatment/procedure(s) that necessitate an extension of the original treatment/procedure(s) or the performance of procedures other than those identified above. I consent to the performance of additional treatment/procedure(s) by my provider and the qualified medical practitioners assisting my provider as deemed necessary by my provider for treatment of my medical condition(s).   
 
I understand that certain complications may arise during the use of analgesia during my treatment/procedure(s) that may include, but are not limited to, decreased/altered awareness, respiratory problems, drug reactions, paralysis, brain damage, or possibly, death. I understand that the analgesia required for the performance of my treatment/procedure(s) involves additional risks beyond those of the treatment/procedure(s) to be performed, and authorize the use of analgesia by my provider as deemed necessary for the control of pain during my treatment/procedure(s). I understand that my provider may need to change the type of analgesia or medications used, possibly without explanation to me, and I consent to any such changes as deemed necessary by my provider for treatment of my medical condition(s). I understand that there are risks of infection and other unexpected complications associated with any medical or surgical treatment/procedure including the treatment/procedure(s) listed above or other treatment/procedure(s) necessitated by my medical condition, and that such complications may occur in the absence of any negligence on the part of my healthcare providers. My provider has explained to my satisfaction my medical condition and the specific treatment/procedure(s) recommended and identified above. I have been given an opportunity to ask and have answered to my satisfaction questions about: (CONTINUED PAGE 2) NAME: Cielo Briceno [address] [place] [address] : 1965 MRN: [id number]  The nature and extent of the treatment/procedure(s) to be performed;  The benefit of treatment, and the risks associated with not having the recommended treatment/procedure(s);  The risks and possible complications associated with having the treatment, including those which, even though unlikely to occur, may involve serious consequences;  
 Analgesia and alternative forms of analgesia;  Alternative procedures and methods of treatment, and the risk associated with each;  
  The expected consequences of the treatment/procedure(s) on my future health. I understand that no assurance can be given that the treatment/procedure(s) performed will be a success, and no guarantee or warranty of success for the treatment/procedure(s) has been given to me by my provider. I consent to the disposal by the examining Pathologist of any tissue removed during my treatment/procedure(s) in accordance with the receiving hospitals or laboratorys policy and any associated regulations specific to such disposal.  
 
I DO_____  DO NOT__x__ consent to other health care personnel observing my treatment/procedure(s) for the purpose of medical education or other specified purposes as may be explained by my provider. I DO_______ DO NOT__x__ consent to photography or videotaping of all or any part of my treatment/procedure(s) for medical and/or educational purposes. I understand that my identity will not be revealed in any photographs, videos, or accompanying explanations should these images be used by my healthcare providers. I certify that I have read and fully understand the above Consent for Treatment/Procedure and that all blanks were completed before I signed this consent.  
 
__________Kamar Hand_________                      _______________ Print Name of Patient or Legal Representative        Relationship to Patient (if not self) X_______________________________            __________________________ Signature of Patient (or legal representative)  Witness to signature    
 
                       __8/22/2019___/_________AM/PM 
                                                                   Date/Time (If patient is a minor or is unable to sign, complete the following) Patient is a minor, ________ years of age, or is unable to sign due to:______________________ I have explained the nature, purpose and anticipated benefits as well as any possible alternative methods of treatment, the known risks that are involved, and the possibility of complications of the proposed procedure(s) to the patient or patients legal representative. I have provided the patient or legal representative with an opportunity to ask and have answered to their satisfaction any questions about the proposed treatment/procedure(s) and alternative methods of treatment.   
 
Provider Signature:___________________  Date:__8/22/2019__Time:_____________AM/PM

## 2019-08-22 NOTE — PROGRESS NOTES
Pt states he was cutting his grass in November last year and felt a crunching sensation to the right shoulder.

## 2019-08-22 NOTE — PROGRESS NOTES
HISTORY OF PRESENT Jose Unger is a 48y.o. year old male comes in today as new patient to be evaluated and treated at the request of Mateusz Delnog MD for my opinion/advice regarding: neck, right shoulder pain    Patients symptoms have been present for 9 or so  months. Pain level 7/10 neck into upper arm. It has worsened with certain motions overhead and reach behind. Patient has tried:  Topical from a friend w/ delbert ebenefit and heat. It is described as pain in shoulder/arm after crunching sounds started using lawnmower last year in November. Past Surgical History:   Procedure Laterality Date    HX APPENDECTOMY      HX CATARACT REMOVAL Right      Social History     Socioeconomic History    Marital status:      Spouse name: Not on file    Number of children: Not on file    Years of education: Not on file    Highest education level: Not on file   Tobacco Use    Smoking status: Never Smoker    Smokeless tobacco: Never Used   Substance and Sexual Activity    Alcohol use: No    Drug use: No     Current Outpatient Medications   Medication Sig Dispense Refill    lisinopril (PRINIVIL, ZESTRIL) 5 mg tablet TAKE 1 TABLET BY MOUTH EVERY DAY 90 Tab 3    atorvastatin (LIPITOR) 10 mg tablet TAKE 1 TABLET BY MOUTH DAILY 90 Tab 3    metFORMIN (GLUCOPHAGE) 500 mg tablet Take 1 Tab by mouth two (2) times daily (with meals). 90 Tab 3    aspirin delayed-release 81 mg tablet Take 1 Tab by mouth daily. 90 Tab 3    ibuprofen (MOTRIN) 800 mg tablet Take 1 Tab by mouth every eight (8) hours as needed for Pain. 27 Tab 0     Past Medical History:   Diagnosis Date    Controlled type 2 diabetes mellitus without complication, without long-term current use of insulin ([address] 7/11/2018    Dermatitis 8/8/2018    Diabetes ([address]     Hyperlipemia     Hypertension     Overweight (BMI 25.0-29. 9) 8/8/2018     Family History   Problem Relation Age of Onset    Heart Disease Mother     Hypertension Mother    [facility] Dementia Father          ROS:  Some numb, tingle right arm/hand. No swell. All other systems reviewed and negative aside from that written in the HPI. Objective:  /80   Pulse 82   Temp 98.4 °F (36.9 °C)   Resp 15   Ht 5' 4\" (1.626 m)   Wt 168 lb (76.2 kg)   BMI 28.84 kg/m²   GEN:  Appears stated age in NAD. HEAD:  Normocephalic, Atraumatic. NEURO:  Sensation intact light touch B/L upper extremities. right hand dominant. DTRs normal biceps and triceps   M/S:  Shoulder ROM Decreased  right. Spurling's negative bilaterally  right Shoulder:  Empty can positive External rotation positive. Internal rotation positive. [place] negative. SLAP negative. Load and Shift +1 Anterior, 1 Posterior. Strength +5/5 bilaterally upper extremities. Crossover test negative. Negative atrophy bilaterally. Negative TTP at [facility] joint. Apprehension test negative. Garcia-Amadou Test positive. Yergason's test negative. Speed's test negative. EXT no Clubbing/cyanosis. no edema. SKIN: Warm, dry w/o rash. HEENT: Conjunctiva/lids WNL. External canals/nares WNL. Tongue midline. PERRL, EOMI. Hearing intact. NECK: Trachea midline. Supple, Full ROM. No thyromegaly. CARDIAC: No edema. LUNGS: Normal effort. ABD: Soft, NT. No HSM. PSYCH: A+O x3. Appropriate judgment and insight. Assessment/Plan:     ICD-10-CM ICD-9-CM    1. Shoulder impingement, right M75.41 726.2 REFERRAL TO PHYSICAL THERAPY      DRAIN/INJECT LARGE JOINT/BURSA      meloxicam (MOBIC) 15 mg tablet      METHYLPREDNISOLONE ACETATE INJECTION 40 MG      methylPREDNISolone acetate (DEPO-MEDROL) 40 mg/mL injection   2. Rotator cuff strain, right, initial encounter S46.011A 840.4 REFERRAL TO PHYSICAL THERAPY      meloxicam (MOBIC) 15 mg tablet      METHYLPREDNISOLONE ACETATE INJECTION 40 MG      methylPREDNISolone acetate (DEPO-MEDROL) 40 mg/mL injection       Patient (or guardian if minor) verbalizes understanding of evaluation and plan.     Keenan linject subacromial today and start PT/HEP w/ mobic PRn and RCT 6 weeks.

## 2019-08-22 NOTE — PROCEDURES
PROCEDURE NOTE:  Time out: 238pm  * Patient was identified by name and date of birth   * Agreement on procedure being performed was verified  * Risks and Benefits explained to the patient  * Procedure site verified and marked as necessary  * Patient was positioned for comfort  * Consent was signed and verified. Risks/benefits including but not limited to bleeding, infection, and scarring discussed and Pt wishes to proceed with procedure. The area was prepped with betadine. Ethyl chloride spray was used, under sterile technique and without ultrasound guidance 1cc of 40mg/cc methylprednisolone acetate and 4cc lidocaine were injected into right subacromial space. Sterile gauze used to clean the area. Blood loss minimal.  Noticed improvement in pain Sx within 5 minutes (now rated 1-2/10). Tolerated procedure well. Discussed possible signs/Sx of infxn, and advised to seek care if concerned.

## 2019-08-22 NOTE — PROGRESS NOTES
AVS reviewed: YES  HEP: AT demonstrated  Resources Provided: YES Both Videos & Photos + PT order  Patient questions/concerns answered: NO. Pt denied questions/concerns  Patient verbalized understanding of treatment plan: YES

## 2019-10-03 ENCOUNTER — OFFICE VISIT (OUTPATIENT)
Dept: ORTHOPEDIC SURGERY | Age: 54
End: 2019-10-03

## 2019-10-03 VITALS
HEART RATE: 74 BPM | HEIGHT: 64 IN | SYSTOLIC BLOOD PRESSURE: 148 MMHG | WEIGHT: 169 LBS | BODY MASS INDEX: 28.85 KG/M2 | DIASTOLIC BLOOD PRESSURE: 76 MMHG | RESPIRATION RATE: 15 BRPM | TEMPERATURE: 98.5 F

## 2019-10-03 DIAGNOSIS — S46.011D ROTATOR CUFF STRAIN, RIGHT, SUBSEQUENT ENCOUNTER: ICD-10-CM

## 2019-10-03 DIAGNOSIS — M75.41 SHOULDER IMPINGEMENT, RIGHT: Primary | ICD-10-CM

## 2019-10-03 NOTE — PATIENT INSTRUCTIONS
Wall Walk Side                       Wall Walk Front  Standing with the wall on your bad          Stand facing the wall, place forearm on  side place forearm on the wall. the wall. Walk your arm up the wall as   Walk your arm up the wall as shown. shown in picture. Pull bad shoulder up behind back   with good arm. Hold 5 seconds. Repeat each 15 Times  Perform 2 Time(s) a Day  Warming up with heating pad or doing these in  hot shower makes it much easier.   Search YouTube for my channel:    Dr. Ar [email or web address]

## 2019-10-03 NOTE — PROGRESS NOTES
HISTORY OF PRESENT Hua Schaeffer is a 48y.o. year old male comes in today to be evaluated and treated for: right shoulder pain    Since last appt has noticed great improvement after injection and HEP about 2-3/week. Pain level 2/10. Using mobic with benefit. Past Surgical History:   Procedure Laterality Date    HX APPENDECTOMY      HX CATARACT REMOVAL Right      Social History     Socioeconomic History    Marital status:      Spouse name: Not on file    Number of children: Not on file    Years of education: Not on file    Highest education level: Not on file   Tobacco Use    Smoking status: Never Smoker    Smokeless tobacco: Never Used   Substance and Sexual Activity    Alcohol use: No    Drug use: No     Current Outpatient Medications   Medication Sig Dispense Refill    lisinopril (PRINIVIL, ZESTRIL) 5 mg tablet TAKE 1 TABLET BY MOUTH EVERY DAY 90 Tab 3    atorvastatin (LIPITOR) 10 mg tablet TAKE 1 TABLET BY MOUTH DAILY 90 Tab 3    meloxicam (MOBIC) 15 mg tablet Take 1 tab daily as needed pain with food. 90 Tab 0    metFORMIN (GLUCOPHAGE) 500 mg tablet Take 1 Tab by mouth two (2) times daily (with meals). 90 Tab 3    aspirin delayed-release 81 mg tablet Take 1 Tab by mouth daily. 90 Tab 3    ibuprofen (MOTRIN) 800 mg tablet Take 1 Tab by mouth every eight (8) hours as needed for Pain. 27 Tab 0     Past Medical History:   Diagnosis Date    Controlled type 2 diabetes mellitus without complication, without long-term current use of insulin ([address] 7/11/2018    Dermatitis 8/8/2018    Diabetes ([address]     Hyperlipemia     Hypertension     Overweight (BMI 25.0-29. 9) 8/8/2018     Family History   Problem Relation Age of Onset    Heart Disease Mother     Hypertension Mother     Dementia Father        ROS:  No longer numb, tingle right arm/hand. No swell.       Objective:  /76   Pulse 74   Temp 98.5 °F (36.9 °C)   Resp 15   Ht 5' 4\" (1.626 m)   Wt 169 lb (76.7 kg)   BMI 29.01 kg/m²   GEN:  Appears stated age in NAD. HEAD:  Normocephalic, Atraumatic. NEURO:  Sensation intact light touch B/L upper extremities. right hand dominant. DTRs normal biceps and triceps   M/S:  Shoulder ROM Decreased  right. Spurling's negative bilaterally  right Shoulder:  Empty can positive External rotation negative. Internal rotation negative. [place] negative. SLAP negative. Load and Shift +1 Anterior, 1 Posterior. Strength +5/5 bilaterally upper extremities. Crossover test negative. Negative atrophy bilaterally. Negative TTP at [facility] joint. Apprehension test negative. Garcia-Amadou Test limited but no pain. Yergason's test negative. Speed's test negative. EXT no Clubbing/cyanosis. no edema. SKIN: Warm, dry w/o rash. Assessment/Plan:     ICD-10-CM ICD-9-CM    1. Shoulder impingement, right M75.41 726.2    2. Rotator cuff strain, right, subsequent encounter S46.011D V58.89      840.4        Patient (or guardian if minor) verbalizes understanding of evaluation and plan. Keenan contineu HEP/ROM for maint w/ mobic PRn and RTC as needed.

## 2019-10-21 ENCOUNTER — OFFICE VISIT (OUTPATIENT)
Dept: FAMILY MEDICINE CLINIC | Facility: CLINIC | Age: 54
End: 2019-10-21

## 2019-10-21 VITALS
SYSTOLIC BLOOD PRESSURE: 130 MMHG | WEIGHT: 168.2 LBS | DIASTOLIC BLOOD PRESSURE: 80 MMHG | OXYGEN SATURATION: 95 % | HEART RATE: 63 BPM | TEMPERATURE: 98.2 F | HEIGHT: 64 IN | BODY MASS INDEX: 28.71 KG/M2 | RESPIRATION RATE: 16 BRPM

## 2019-10-21 DIAGNOSIS — E66.3 OVERWEIGHT (BMI 25.0-29.9): ICD-10-CM

## 2019-10-21 DIAGNOSIS — E11.9 CONTROLLED TYPE 2 DIABETES MELLITUS WITHOUT COMPLICATION, WITHOUT LONG-TERM CURRENT USE OF INSULIN (HCC): Primary | ICD-10-CM

## 2019-10-21 DIAGNOSIS — E78.49 OTHER HYPERLIPIDEMIA: ICD-10-CM

## 2019-10-21 RX ORDER — METFORMIN HYDROCHLORIDE 500 MG/1
500 TABLET ORAL 2 TIMES DAILY WITH MEALS
Qty: 90 TAB | Refills: 3 | Status: SHIPPED | OUTPATIENT
Start: 2019-10-21 | End: 2020-07-13 | Stop reason: SDUPTHER

## 2019-10-21 RX ORDER — ASPIRIN 81 MG/1
81 TABLET ORAL DAILY
Qty: 90 TAB | Refills: 3
Start: 2019-10-21 | End: 2020-04-05

## 2019-10-21 NOTE — PROGRESS NOTES
Earnest Roberts is a 48 y.o.  male presents today for office visit for follow up. Pt would also like to discuss DM. Pt is not fasting. Pt is in Room # 2      1. Have you been to the ER, urgent care clinic since your last visit? Hospitalized since your last visit? No    2. Have you seen or consulted any other health care providers outside of the [address] [place] [address] since your last visit? Include any pap smears or colon screening. No    Upcoming Appts  none    Health Maintenance reviewed       VORB: No orders of the defined types were placed in this encounter.   Rupal Gonsalves, LPN

## 2019-10-21 NOTE — PROGRESS NOTES
Internal Medicine Progress Note    Today's Date:  10/28/2019   Patient:  [address] [facility]  Patient :  1965    Subjective:     Chief Complaint   Patient presents with    Weight Management    Cholesterol Problem    Diabetes     BS: did not check     Results       Diabetes mellitus  This is a chronic problem. BS is at goal. Pt is on metformin. Pt reports compliance with this medication. Hyperlipidemia  This is a chronic problem. This is at goal. Pt takes lipitor. Pt reports compliance with this medication.      Overweight  This is a chronic problem. This is not at goal. Pt was told about the ketogenic/IF diet during a previous visit. Pt lost weight since the last visit. 3 most recent PHQ Screens 2019   Little interest or pleasure in doing things Not at all   Feeling down, depressed, irritable, or hopeless Not at all   Total Score PHQ 2 0      Past Medical History:   Diagnosis Date    Controlled type 2 diabetes mellitus without complication, without long-term current use of insulin ([address] 2018    Dermatitis 2018    [facility]     Hyperlipemia     Hypertension     Overweight (BMI 25.0-29. 9) 2018     Past Surgical History:   Procedure Laterality Date    HX APPENDECTOMY      HX CATARACT REMOVAL Right       reports that he has never smoked. He has never used smokeless tobacco. He reports that he does not drink alcohol or use drugs.   Family History   Problem Relation Age of Onset    Heart Disease Mother     Hypertension Mother     Dementia Father      Allergies   Allergen Reactions    Chicken Derived Hives    Shellfish Derived Hives     Review of Systems   CV:      chest pain, palpitations  PULM:  SOB, wheezing, cough, sputum production    Current Outpatient Meds      Current Outpatient Medications on File Prior to Visit   Medication Sig Dispense Refill    lisinopril (PRINIVIL, ZESTRIL) 5 mg tablet TAKE 1 TABLET BY MOUTH EVERY DAY 90 Tab 3    atorvastatin (LIPITOR) 10 mg tablet TAKE 1 TABLET BY MOUTH DAILY 90 Tab 3    meloxicam (MOBIC) 15 mg tablet Take 1 tab daily as needed pain with food. 90 Tab 0    ibuprofen (MOTRIN) 800 mg tablet Take 1 Tab by mouth every eight (8) hours as needed for Pain. 30 Tab 0     No current facility-administered medications on file prior to visit. Objective:       Visit Vitals  /80 (BP 1 Location: Left arm, BP Patient Position: Sitting)   Pulse 63   Temp 98.2 °F (36.8 °C) (Oral)   Resp 16   Ht 5' 4\" (1.626 m)   Wt 168 lb 3.2 oz (76.3 kg)   SpO2 95%   BMI 28.87 kg/m²     General:   Well-nourished, well-groomed, pleasant, alert, in no acute distress  Head:  Normocephalic, atraumatic  Ears:  External ears WNL  Nose:  External nares WNL  Psych:  No pressured speech, no abnormal thought content  Diabetic foot exam: monofilament exam normal, no open lesions or or erythema    Lab Results   Component Value Date/Time    Hemoglobin A1c 6.2 (H) 07/15/2019 08:19 AM    Glucose 156 (H) 07/15/2019 08:19 AM    Glucose  12/06/2017 02:40 PM    Microalbumin/Creat ratio (mg/g creat) 7 07/22/2019 10:23 AM    Microalbumin,urine random 0.51 07/22/2019 10:23 AM    LDL, calculated 70 07/15/2019 08:19 AM    Creatinine 0.90 07/15/2019 08:19 AM      Assessment/Plan & Orders:         ICD-10-CM ICD-9-CM    1. Controlled type 2 diabetes mellitus without complication, without long-term current use of insulin (HCC) E11.9 250.00 HM DIABETES FOOT EXAM      metFORMIN (GLUCOPHAGE) 500 mg tablet      AMB POC HEMOGLOBIN A1C   2. Other hyperlipidemia E78.49 272.4    3. Overweight (BMI 25.0-29. 9) E66.3 278.02       Diet and exercise    Follow-up and Dispositions    · Return in about 3 months (around 1/21/2020) for Weight management, Hyperlipidemia, Diabetes mellitus. *Patient verbalized understanding and agreement with the plan. Patient was given an after-visit summary. Severa Dart.  [facility] [address], MD - Internal Medicine  10/28/2019, 10:46 AM  [facility]  [address], [address]  Phone [phone]  Fax [phone]

## 2019-10-22 LAB — HBA1C MFR BLD HPLC: 6.3 %

## 2019-10-22 NOTE — PATIENT INSTRUCTIONS

## 2020-01-13 ENCOUNTER — OFFICE VISIT (OUTPATIENT)
Dept: FAMILY MEDICINE CLINIC | Facility: CLINIC | Age: 55
End: 2020-01-13

## 2020-01-13 VITALS
TEMPERATURE: 98 F | RESPIRATION RATE: 16 BRPM | DIASTOLIC BLOOD PRESSURE: 80 MMHG | OXYGEN SATURATION: 96 % | SYSTOLIC BLOOD PRESSURE: 110 MMHG | HEART RATE: 69 BPM | BODY MASS INDEX: 29.06 KG/M2 | WEIGHT: 170.2 LBS | HEIGHT: 64 IN

## 2020-01-13 DIAGNOSIS — E66.3 OVERWEIGHT (BMI 25.0-29.9): ICD-10-CM

## 2020-01-13 DIAGNOSIS — E11.9 CONTROLLED TYPE 2 DIABETES MELLITUS WITHOUT COMPLICATION, WITHOUT LONG-TERM CURRENT USE OF INSULIN (HCC): Primary | ICD-10-CM

## 2020-01-13 DIAGNOSIS — Z13.31 SCREENING FOR DEPRESSION: ICD-10-CM

## 2020-01-13 DIAGNOSIS — E78.49 OTHER HYPERLIPIDEMIA: ICD-10-CM

## 2020-01-13 LAB — HBA1C MFR BLD HPLC: 6.2 %

## 2020-01-13 NOTE — PATIENT INSTRUCTIONS
Diabetes and Alcohol: Care Instructions  Your Care Instructions    People who have diabetes need to be more careful with alcohol. Before you drink, consider a few things: Is your diabetes well controlled? Do you know how drinking alcohol can affect you? Do you have high blood pressure, nerve damage, or eye problems from your diabetes? If you take insulin or another medicine for diabetes, drinking alcohol may cause low blood sugar. This could cause dangerous low blood sugar levels. Too much alcohol can also affect your ability to know your blood sugar is low and to treat it. Drinking alcohol can make you lightheaded at first and drowsy as you drink more, both of which may be similar to the symptoms of low blood sugar. Drinking a lot of alcohol over a long period of time can damage your liver (cirrhosis). If this happens, your body may lose its natural response to protect itself from low blood sugar. If you are controlling your diabetes and do not have other health issues, it may be okay to have a drink once in a while. Learning how alcohol affects your body can help you make the right choices. Follow-up care is a key part of your treatment and safety. Be sure to make and go to all appointments, and call your doctor if you are having problems. It's also a good idea to know your test results and keep a list of the medicines you take. How can you care for yourself at home? If you drink  · Work with your doctor or other diabetes expert to find what is best for you. Make sure you know whether it is safe to drink if you are taking insulin or another medicine for diabetes. · In general, limit alcohol to 1 drink a day with a meal if you are a woman. If you are a man, limit alcohol to 2 drinks a day with a meal. The following is considered a standard drink:  ? One 12-ounce bottle of beer or wine cooler  ? One 5-ounce glass of wine  ?  One mixed drink with 1.5 ounces of 80-proof hard liquor, such as gin, whiskey, or rum  · Choose alcoholic drinks wisely. With hard alcohol, use sugar-free mixers, such as diet tonic, water, or club soda. Pick drinks that have less alcohol, including light beer or dry wine. Or add club soda to wine to dilute it. Also remember that most alcoholic drinks have a lot of calories. · When you drink, check your blood sugar before you go to bed. Have a snack before bed so your blood sugar does not drop while you sleep. When not to drink  · Never drink on an empty stomach. If you do drink alcohol, drink it only with a meal or snack. Having as little as 2 drinks on an empty stomach could lead to low blood sugar. · Do not drink alcohol if you have problems recognizing the signs of low blood sugar until they become severe. · Do not drink alcohol after you exercise. The exercise itself lowers blood sugar. · Do not drink if you have nerve damage. Drinking can make it worse and increase the pain, numbness, and other symptoms. · Do not drink if you have high blood pressure. · Do not drink if you have diabetic eye disease. · Do not drink if you have high triglycerides, a type of fat in your blood. Drinking can raise triglycerides. · Do not drink if you are trying to lose weight. Alcohol provides empty calories that do not give you any nutrients. · Do not drink and drive. The effects of alcohol are greater if you have low blood sugar. When should you call for help? Call 911 anytime you think you may need emergency care. For example, call if:    · You passed out (lost consciousness).     · You are confused or cannot think clearly.     · Your blood sugar is very high or very low.    Watch closely for changes in your health, and be sure to contact your doctor if:    · Your blood sugar stays outside the level your doctor set for you.     · You have any problems. Where can you learn more? Go to http://loren-renee.info/.   Enter T236 in the search box to learn more about \"Diabetes and Alcohol: Care Instructions. \"  Current as of: April 16, 2019  Content Version: 12.2  © 8024-0929 [employer]. Care instructions adapted under license by [employer] (which disclaims liability or warranty for this information). If you have questions about a medical condition or this instruction, always ask your healthcare professional. [place] [address] any warranty or liability for your use of this information.

## 2020-01-13 NOTE — PROGRESS NOTES
[address] [place] [address] is a 47 y.o.  male presents today for office visit for follow up. Pt would also like to discuss DM. Pt is not fasting. Pt is in Room # 2      1. Have you been to the ER, urgent care clinic since your last visit? Hospitalized since your last visit? No    2. Have you seen or consulted any other health care providers outside of the [address] [place] [address] since your last visit? Include any pap smears or colon screening. No    Upcoming Appts  none    Health Maintenance reviewed      VORB: No orders of the defined types were placed in this encounter.   Janette Caballero LPN

## 2020-01-13 NOTE — PROGRESS NOTES
Internal Medicine Progress Note    Today's Date:  2020   Patient:  [facility]  Patient :  1965    Subjective:     Chief Complaint   Patient presents with    Diabetes     BS: did not check     Cholesterol Problem    Weight Management       Diabetes mellitus  This is a chronic problem. BS is at goal. Pt is on metformin. Pt reports compliance with this medication. Hyperlipidemia  This is a chronic problem. This is at goal. Pt takes lipitor. Pt reports compliance with this medication.      Overweight  This is a chronic problem. This is not at goal. Pt was told about the ketogenic/IF diet during a previous visit. Pt gained weight since the last visit. 3 most recent PHQ Screens 2020   Little interest or pleasure in doing things Not at all   Feeling down, depressed, irritable, or hopeless Not at all   Total Score PHQ 2 0      Past Medical History:   Diagnosis Date    Controlled type 2 diabetes mellitus without complication, without long-term current use of insulin ([address] 2018    Dermatitis 2018    Diabetes [facility]     Hyperlipemia     Hypertension     Overweight (BMI 25.0-29. 9) 2018     Past Surgical History:   Procedure Laterality Date    HX APPENDECTOMY      HX CATARACT REMOVAL Right       reports that he has never smoked. He has never used smokeless tobacco. He reports that he does not drink alcohol or use drugs. Family History   Problem Relation Age of Onset    Heart Disease Mother     Hypertension Mother     Dementia Father      Allergies   Allergen Reactions    Chicken Derived Hives    Shellfish Derived Hives     Review of Systems   CV:      chest pain, palpitations  PULM:  SOB, wheezing, cough, sputum production    Current Outpatient Meds      Current Outpatient Medications on File Prior to Visit   Medication Sig Dispense Refill    metFORMIN (GLUCOPHAGE) 500 mg tablet Take 1 Tab by mouth two (2) times daily (with meals).  90 Tab 3    aspirin delayed-release 81 mg tablet Take 1 Tab by mouth daily. 90 Tab 3    lisinopril (PRINIVIL, ZESTRIL) 5 mg tablet TAKE 1 TABLET BY MOUTH EVERY DAY 90 Tab 3    atorvastatin (LIPITOR) 10 mg tablet TAKE 1 TABLET BY MOUTH DAILY 90 Tab 3    [DISCONTINUED] meloxicam (MOBIC) 15 mg tablet Take 1 tab daily as needed pain with food. 90 Tab 0    [DISCONTINUED] ibuprofen (MOTRIN) 800 mg tablet Take 1 Tab by mouth every eight (8) hours as needed for Pain. 30 Tab 0     No current facility-administered medications on file prior to visit. Objective:       Visit Vitals  /80 (BP 1 Location: Left arm, BP Patient Position: Sitting)   Pulse 69   Temp 98 °F (36.7 °C) (Oral)   Resp 16   Ht 5' 4\" (1.626 m)   Wt 170 lb 3.2 oz (77.2 kg)   SpO2 96%   BMI 29.21 kg/m²     General:   Well-nourished, well-groomed, pleasant, alert, in no acute distress  Head:  Normocephalic, atraumatic  Ears:  External ears WNL  Nose:  External nares WNL  Psych:  No pressured speech, no abnormal thought content    Last Point of Care HGB A1C  Hemoglobin A1c (POC)   Date Value Ref Range Status   01/13/2020 6.2 % Final      Assessment/Plan & Orders:         ICD-10-CM ICD-9-CM    1. Controlled type 2 diabetes mellitus without complication, without long-term current use of insulin (HCC) E11.9 250.00 AMB POC HEMOGLOBIN A1C      CBC WITH AUTOMATED DIFF      LIPID PANEL      METABOLIC PANEL, COMPREHENSIVE      HEMOGLOBIN A1C WITH EAG   2. Other hyperlipidemia E78.49 272.4    3. Overweight (BMI 25.0-29. 9) E66.3 278.02    4. Screening for depression Z13.31 V79.0 NE PATIENT SCREENED FOR DEPRESSION      Diet and exercise    Follow-up and Dispositions    · Return in about 6 months (around 7/13/2020) for CPE, Go over labs/imaging, Diabetes mellitus, Weight management. *Patient verbalized understanding and agreement with the plan. Patient was given an after-visit summary. Jericho Nowak.  Eliu Quan MD - Internal Medicine  1/13/2020, 10:46 AM  [facility] [address] [facility], [address]  Phone [phone]  Fax [phone]

## 2020-07-13 ENCOUNTER — TELEPHONE (OUTPATIENT)
Dept: FAMILY MEDICINE CLINIC | Facility: CLINIC | Age: 55
End: 2020-07-13

## 2020-07-13 DIAGNOSIS — E11.9 CONTROLLED TYPE 2 DIABETES MELLITUS WITHOUT COMPLICATION, WITHOUT LONG-TERM CURRENT USE OF INSULIN (HCC): ICD-10-CM

## 2020-07-13 RX ORDER — METFORMIN HYDROCHLORIDE 500 MG/1
500 TABLET ORAL 2 TIMES DAILY WITH MEALS
Qty: 90 TAB | Refills: 1 | Status: SHIPPED | OUTPATIENT
Start: 2020-07-13

## 2020-07-21 RX ORDER — LISINOPRIL 2.5 MG/1
2.5 TABLET ORAL DAILY
Qty: 90 TAB | Refills: 0 | Status: SHIPPED | OUTPATIENT
Start: 2020-07-21 | End: 2020-07-21 | Stop reason: SDUPTHER

## 2020-07-21 RX ORDER — LISINOPRIL 2.5 MG/1
5 TABLET ORAL DAILY
Qty: 180 TAB | Refills: 0 | Status: SHIPPED | OUTPATIENT
Start: 2020-07-21 | End: 2020-08-25 | Stop reason: DRUGHIGH

## 2020-07-21 NOTE — TELEPHONE ENCOUNTER
Refill request was for 2.5 once daily. He is actually taking 5 mg daily. Will rewrite RX for 2.5 mg x 2 daily.

## 2020-10-09 DIAGNOSIS — E11.9 DIABETES MELLITUS WITHOUT COMPLICATION (HCC): ICD-10-CM

## 2020-10-09 NOTE — TELEPHONE ENCOUNTER
Requested Prescriptions     Pending Prescriptions Disp Refills    atorvastatin (LIPITOR) 10 mg tablet 90 Tab 3     No future appointments. Informed patient that he needed to schedule an appointment with Dr Foreign Kent and he said ok thank you and hung up.   No appointment scheduled

## 2020-11-13 RX ORDER — ATORVASTATIN CALCIUM 10 MG/1
TABLET, FILM COATED ORAL
Qty: 90 TAB | Refills: 3 | OUTPATIENT
Start: 2020-11-13

## 2021-01-25 ENCOUNTER — IMPORTED ENCOUNTER (OUTPATIENT)
Dept: URBAN - METROPOLITAN AREA CLINIC 1 | Facility: CLINIC | Age: 56
End: 2021-01-25

## 2021-01-25 PROBLEM — Z79.84: Noted: 2021-01-25

## 2021-01-25 PROBLEM — Z96.1: Noted: 2021-01-25

## 2021-01-25 PROBLEM — E11.9: Noted: 2021-01-25

## 2021-01-25 PROCEDURE — 92014 COMPRE OPH EXAM EST PT 1/>: CPT

## 2021-01-25 PROCEDURE — 92015 DETERMINE REFRACTIVE STATE: CPT

## 2021-01-25 NOTE — PATIENT DISCUSSION
Pseudophakia OU - H/o YAG Cap OD  (PCL & YAG Cap done in [place] [address] by [facility]). Consider IOL Exchange OD   MRX for glasses given.

## 2021-01-25 NOTE — PATIENT DISCUSSION
1.  DM Type II (Oral Medication) without sign of diabetic retinopathy and no blot heme on dilated retinal examination today OU No Macular Edema:  Discussed the pathophysiology of diabetes and its effect on the eye and risk of blindness. Stressed the importance of strong glucose control. Advised of importance of at least yearly dilated examinations but to contact us immediately for any problems or concerns. 2. Pseudophakia OU - H/o YAG Cap OD (PCL & YAG Cap done in [place] [address] by [facility]). Patient seems to be picking up on slight haze within IOL itself. Will observe. Consider IOL exhchange OD  MRX for glasses given. Return for an appointment in 1 year 27 with Dr. Maco Baez.

## 2021-01-25 NOTE — PATIENT DISCUSSION
Problem: Patient Care Overview (Adult)  Goal: Plan of Care Review  Outcome: Ongoing (interventions implemented as appropriate)    02/09/17 8716   Coping/Psychosocial Response Interventions   Plan Of Care Reviewed With patient   Patient Care Overview   Progress progress towards functional goals is fair   Outcome Evaluation   Outcome Summary/Follow up Plan Pt is continueing to have increased pain with transfers when standing with RWX            Type II diabetes controlled by oral medications.

## 2022-01-25 ENCOUNTER — IMPORTED ENCOUNTER (OUTPATIENT)
Dept: URBAN - METROPOLITAN AREA CLINIC 1 | Facility: CLINIC | Age: 57
End: 2022-01-25

## 2022-01-25 PROBLEM — E11.9: Noted: 2022-01-25

## 2022-01-25 PROBLEM — Z96.1: Noted: 2022-01-25

## 2022-01-25 PROBLEM — Z79.84: Noted: 2022-01-25

## 2022-01-25 PROCEDURE — 92015 DETERMINE REFRACTIVE STATE: CPT

## 2022-01-25 PROCEDURE — 92014 COMPRE OPH EXAM EST PT 1/>: CPT

## 2022-01-25 NOTE — PATIENT DISCUSSION
1.  DM Type II (Oral Meds) without sign of diabetic retinopathy and no blot heme on dilated retinal examination today OU No Macular Edema:  Discussed the pathophysiology of diabetes and its effect on the eye and risk of blindness. Stressed the importance of strong glucose control. Advised of importance of at least yearly dilated examinations but to contact us immediately for any problems or concerns. 2. [place] w/ PEK OU -- Recommend ATs TID OU routinely. 3.  Pseudophakia OU -- H/o YAG Cap OD (PCL & YAG Cap done in [place] [address] by [facility]). MRx for glasses given to patient. Letter to PCP. Return for an appointment in 1 year 27 with Dr. Devan Barrera.

## 2022-04-02 ASSESSMENT — TONOMETRY
OS_IOP_MMHG: 18
OD_IOP_MMHG: 21
OS_IOP_MMHG: 23
OS_IOP_MMHG: 20
OS_IOP_MMHG: 20
OD_IOP_MMHG: 18
OS_IOP_MMHG: 15
OD_IOP_MMHG: 16
OD_IOP_MMHG: 18
OD_IOP_MMHG: 14
OS_IOP_MMHG: 23

## 2022-04-02 ASSESSMENT — VISUAL ACUITY
OS_SC: 20/20
OS_PH: SC 20/20 -1
OS_SC: 20/30
OS_CC: 20/200
OD_CC: J1+
OD_CC: 20/200
OD_SC: J1+-1
OD_SC: 20/20
OD_SC: 20/20
OS_SC: J1+-1
OS_SC: J5-1
OS_CC: J1+
OD_PH: SC 20/30
OD_SC: 20/25
OS_SC: 20/20
OS_SC: CF@6'
OS_CC: 20/30
OD_SC: J3
OD_SC: 20/50-1

## 2023-01-26 ENCOUNTER — COMPREHENSIVE EXAM (OUTPATIENT)
Dept: URBAN - METROPOLITAN AREA CLINIC 1 | Facility: CLINIC | Age: 58
End: 2023-01-26

## 2023-01-26 DIAGNOSIS — E11.9: ICD-10-CM

## 2023-01-26 DIAGNOSIS — H16.143: ICD-10-CM

## 2023-01-26 DIAGNOSIS — H04.123: ICD-10-CM

## 2023-01-26 DIAGNOSIS — H26.492: ICD-10-CM

## 2023-01-26 DIAGNOSIS — Z96.1: ICD-10-CM

## 2023-01-26 DIAGNOSIS — D17.0: ICD-10-CM

## 2023-01-26 PROCEDURE — 92014 COMPRE OPH EXAM EST PT 1/>: CPT

## 2023-01-26 ASSESSMENT — VISUAL ACUITY
OS_CC: 20/20
OU_SC: J1
OD_CC: 20/20

## 2023-01-26 ASSESSMENT — TONOMETRY
OD_IOP_MMHG: 17
OS_IOP_MMHG: 17

## 2023-01-26 NOTE — PATIENT DISCUSSION
Pt notes irritation, itching and increasing in size. Risks and benefits of excision discussed and patient wishes to proceed. 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

## 2023-01-31 ENCOUNTER — CLINIC PROCEDURE ONLY (OUTPATIENT)
Dept: URBAN - METROPOLITAN AREA CLINIC 1 | Facility: CLINIC | Age: 58
End: 2023-01-31

## 2023-01-31 DIAGNOSIS — D17.0: ICD-10-CM

## 2023-01-31 PROCEDURE — 11441 EXC FACE-MM B9+MARG 0.6-1 CM: CPT

## 2023-01-31 NOTE — PROCEDURE NOTE: CLINICAL
PROCEDURE NOTE: Excision Benign Lesion Including Margins, except Skin Tag, Face, Ears, Eyelids, Nose, Lips, Mucous Membrane; Excised Diameter 0.6 to 1.0 cm Anesthesia: Subconjunctival Lidocaine. The patient, procedure, and the correct site were identified. Prior to treatment, the risks/benefits/alternatives were discussed. The patient wished to proceed with procedure. After the risks, benefits, and alternatives were explained to the patient, informed consent was obtained. Topical proparacaine drops were instilled in the eye. 2% Lidocaine jelly was instilled in the eye. The eyelid skin was prepped with an alcohol wipe. A subcutaneous injection of 2% lidocaine with 1:100,000 epinephrine was administered via a TB syringe and 27 g needle. A reasonable period of time was allowed for anesthesia to take effect. A clamp was placed on the eyelid. The lesion was grasped with forceps and excised with scissors while taking care to preserve as much of the tarsus and lid margin as reasonably possible while still permitting an adequate and sizeable specimen for pathology review and confirmation of clean margins. The patient tolerated the procedure well and left the room in good condition. Post-procedurally, wound care instructions were given to the patient. Poornima Vera
